# Patient Record
Sex: FEMALE | Race: WHITE | NOT HISPANIC OR LATINO | Employment: OTHER | URBAN - METROPOLITAN AREA
[De-identification: names, ages, dates, MRNs, and addresses within clinical notes are randomized per-mention and may not be internally consistent; named-entity substitution may affect disease eponyms.]

---

## 2023-06-15 ENCOUNTER — HOSPITAL ENCOUNTER (OUTPATIENT)
Dept: RADIOLOGY | Facility: HOSPITAL | Age: 68
Discharge: HOME | End: 2023-06-15
Attending: FAMILY MEDICINE
Payer: MEDICARE

## 2023-06-15 DIAGNOSIS — R10.9 ABDOMINAL PAIN: ICD-10-CM

## 2023-06-15 DIAGNOSIS — K80.20 GALLSTONES: ICD-10-CM

## 2023-06-15 PROCEDURE — 78227 NM HEPATOBILIARY(HIDA) WITH PHARM AND EF WHEN PERFORMED: ICD-10-PCS | Mod: 26,,, | Performed by: RADIOLOGY

## 2023-06-15 PROCEDURE — 78227 HEPATOBIL SYST IMAGE W/DRUG: CPT | Mod: 26,,, | Performed by: RADIOLOGY

## 2023-06-15 RX ORDER — SINCALIDE 5 UG/5ML
0.02 INJECTION, POWDER, LYOPHILIZED, FOR SOLUTION INTRAVENOUS ONCE
Status: DISCONTINUED | OUTPATIENT
Start: 2023-06-15 | End: 2023-06-16 | Stop reason: HOSPADM

## 2023-06-16 ENCOUNTER — TELEPHONE (OUTPATIENT)
Dept: SURGERY | Facility: CLINIC | Age: 68
End: 2023-06-16
Payer: MEDICARE

## 2023-06-16 NOTE — TELEPHONE ENCOUNTER
----- Message from Marcie Morrow sent at 6/16/2023 10:22 AM CDT -----  Regarding: Gen Surgery Referral  .Good morning,     Current patient is being referred to Dr Brittany Cruz from Dr Navid Willingham for gallstones and upper abdominal pain. Left voice mail for patient to call back to schedule. The next available appointment is 07-10-23. Message is being sent because I wasn't sure if you all need to see her sooner. I have scanned the referral and records in to media mgr. Please contact pt to schedule and let me know if I can help any further.     Thank you,    [facility]   Fax: [phone]

## 2023-06-16 NOTE — TELEPHONE ENCOUNTER
Writer spoke to patient and patient stated that she is already scheduled with Dr Gomez on 07/06/23. Writer expressed verbal understanding.

## 2023-07-06 ENCOUNTER — OFFICE VISIT (OUTPATIENT)
Dept: SURGERY | Facility: CLINIC | Age: 68
End: 2023-07-06
Payer: MEDICARE

## 2023-07-06 VITALS
TEMPERATURE: 98 F | HEIGHT: 61 IN | SYSTOLIC BLOOD PRESSURE: 172 MMHG | BODY MASS INDEX: 25.54 KG/M2 | DIASTOLIC BLOOD PRESSURE: 76 MMHG | WEIGHT: 135.25 LBS | HEART RATE: 72 BPM | RESPIRATION RATE: 16 BRPM

## 2023-07-06 DIAGNOSIS — K80.00 CALCULUS OF GALLBLADDER WITH ACUTE CHOLECYSTITIS WITHOUT OBSTRUCTION: Primary | ICD-10-CM

## 2023-07-06 DIAGNOSIS — K81.9 CHOLECYSTITIS: ICD-10-CM

## 2023-07-06 PROCEDURE — 99999 PR PBB SHADOW E&M-EST. PATIENT-LVL IV: ICD-10-PCS | Mod: PBBFAC,,, | Performed by: SURGERY

## 2023-07-06 PROCEDURE — 3077F PR MOST RECENT SYSTOLIC BLOOD PRESSURE >= 140 MM HG: ICD-10-PCS | Mod: CPTII,S$GLB,, | Performed by: SURGERY

## 2023-07-06 PROCEDURE — 1160F RVW MEDS BY RX/DR IN RCRD: CPT | Mod: CPTII,S$GLB,, | Performed by: SURGERY

## 2023-07-06 PROCEDURE — 99999 PR PBB SHADOW E&M-EST. PATIENT-LVL IV: CPT | Mod: PBBFAC,,, | Performed by: SURGERY

## 2023-07-06 PROCEDURE — 1159F MED LIST DOCD IN RCRD: CPT | Mod: CPTII,S$GLB,, | Performed by: SURGERY

## 2023-07-06 PROCEDURE — 99204 PR OFFICE/OUTPT VISIT, NEW, LEVL IV, 45-59 MIN: ICD-10-PCS | Mod: S$GLB,,, | Performed by: SURGERY

## 2023-07-06 PROCEDURE — 1125F AMNT PAIN NOTED PAIN PRSNT: CPT | Mod: CPTII,S$GLB,, | Performed by: SURGERY

## 2023-07-06 PROCEDURE — 1101F PR PT FALLS ASSESS DOC 0-1 FALLS W/OUT INJ PAST YR: ICD-10-PCS | Mod: CPTII,S$GLB,, | Performed by: SURGERY

## 2023-07-06 PROCEDURE — 3078F DIAST BP <80 MM HG: CPT | Mod: CPTII,S$GLB,, | Performed by: SURGERY

## 2023-07-06 PROCEDURE — 1125F PR PAIN SEVERITY QUANTIFIED, PAIN PRESENT: ICD-10-PCS | Mod: CPTII,S$GLB,, | Performed by: SURGERY

## 2023-07-06 PROCEDURE — 99204 OFFICE O/P NEW MOD 45 MIN: CPT | Mod: S$GLB,,, | Performed by: SURGERY

## 2023-07-06 PROCEDURE — 1160F PR REVIEW ALL MEDS BY PRESCRIBER/CLIN PHARMACIST DOCUMENTED: ICD-10-PCS | Mod: CPTII,S$GLB,, | Performed by: SURGERY

## 2023-07-06 PROCEDURE — 3288F PR FALLS RISK ASSESSMENT DOCUMENTED: ICD-10-PCS | Mod: CPTII,S$GLB,, | Performed by: SURGERY

## 2023-07-06 PROCEDURE — 1159F PR MEDICATION LIST DOCUMENTED IN MEDICAL RECORD: ICD-10-PCS | Mod: CPTII,S$GLB,, | Performed by: SURGERY

## 2023-07-06 PROCEDURE — 3288F FALL RISK ASSESSMENT DOCD: CPT | Mod: CPTII,S$GLB,, | Performed by: SURGERY

## 2023-07-06 PROCEDURE — 3008F BODY MASS INDEX DOCD: CPT | Mod: CPTII,S$GLB,, | Performed by: SURGERY

## 2023-07-06 PROCEDURE — 3008F PR BODY MASS INDEX (BMI) DOCUMENTED: ICD-10-PCS | Mod: CPTII,S$GLB,, | Performed by: SURGERY

## 2023-07-06 PROCEDURE — 1101F PT FALLS ASSESS-DOCD LE1/YR: CPT | Mod: CPTII,S$GLB,, | Performed by: SURGERY

## 2023-07-06 PROCEDURE — 3078F PR MOST RECENT DIASTOLIC BLOOD PRESSURE < 80 MM HG: ICD-10-PCS | Mod: CPTII,S$GLB,, | Performed by: SURGERY

## 2023-07-06 PROCEDURE — 3077F SYST BP >= 140 MM HG: CPT | Mod: CPTII,S$GLB,, | Performed by: SURGERY

## 2023-07-06 RX ORDER — SODIUM CHLORIDE 9 MG/ML
INJECTION, SOLUTION INTRAVENOUS CONTINUOUS
Status: CANCELLED | OUTPATIENT
Start: 2023-07-06

## 2023-07-06 RX ORDER — OMEPRAZOLE 40 MG/1
40 CAPSULE, DELAYED RELEASE ORAL EVERY MORNING
COMMUNITY
Start: 2023-04-26 | End: 2024-02-27 | Stop reason: SDUPTHER

## 2023-07-06 NOTE — PROGRESS NOTES
"Initial Consult    Cc: abdominal pain    History of Present Illness:  Patient is a 67 y.o. female who is referred for cholecystitis.  Up to 8-9/10 epigastric pain radiating to right under shoulder.  Associated nausea.  Sometimes worse with foods.  No stool abnormalities.     Review of patient's allergies indicates:  No Known Allergies    Current Outpatient Medications   Medication Sig Dispense Refill    collagen, bovine, 100 % Powd Take 1 tablet by mouth once daily.      omeprazole (PRILOSEC) 40 MG capsule Take 40 mg by mouth every morning.       No current facility-administered medications for this visit.       Past Medical History:   Diagnosis Date    Hiatal hernia with GERD      Past Surgical History:   Procedure Laterality Date    BLADDER SURGERY      HYSTERECTOMY      neck and back surgery       Family History   Problem Relation Age of Onset    Cholelithiasis Mother        Social History     Tobacco Use    Smoking status: Never     Passive exposure: Never    Smokeless tobacco: Never   Substance Use Topics    Alcohol use: Never    Drug use: Never        Review of Systems   Gastrointestinal:  Positive for abdominal pain, constipation and nausea.   All other systems reviewed and are negative.    Physical:     Vital Signs (Most Recent)  Temp: 98 °F (36.7 °C) (07/06/23 1311)  Pulse: 72 (07/06/23 1311)  Resp: 16 (07/06/23 1311)  BP: (!) 172/76 (07/06/23 1311)  5' 1" (1.549 m)  61.4 kg (135 lb 4 oz)     Physical Exam:  Physical Exam  Vitals and nursing note reviewed.   Constitutional:       General: She is not in acute distress.     Appearance: She is well-developed. She is not diaphoretic.   HENT:      Head: Normocephalic and atraumatic.      Mouth/Throat:      Pharynx: No oropharyngeal exudate.   Eyes:      General: No scleral icterus.     Conjunctiva/sclera: Conjunctivae normal.      Pupils: Pupils are equal, round, and reactive to light.   Neck:      Thyroid: No thyromegaly.      Vascular: No JVD.      Trachea: No " tracheal deviation.   Cardiovascular:      Rate and Rhythm: Normal rate and regular rhythm.      Heart sounds: Normal heart sounds. No murmur heard.    No friction rub. No gallop.   Pulmonary:      Effort: Pulmonary effort is normal. No respiratory distress.      Breath sounds: Normal breath sounds. No stridor. No wheezing or rales.   Chest:      Chest wall: No tenderness.   Abdominal:      General: Bowel sounds are normal. There is no distension.      Palpations: Abdomen is soft. There is no mass.      Tenderness: There is no abdominal tenderness. There is no guarding or rebound.   Musculoskeletal:         General: No tenderness. Normal range of motion.      Cervical back: Normal range of motion and neck supple.   Lymphadenopathy:      Cervical: No cervical adenopathy.   Skin:     General: Skin is warm and dry.      Findings: No erythema or rash.   Neurological:      Mental Status: She is alert and oriented to person, place, and time.      Cranial Nerves: No cranial nerve deficit.   Psychiatric:         Behavior: Behavior normal.     ASSESSMENT/PLAN:        1. Calculus of gallbladder with acute cholecystitis without obstruction  Case Request Operating Room: CHOLECYSTECTOMY, LAPAROSCOPIC      2. Cholecystitis            Lap karly monday

## 2023-07-07 ENCOUNTER — HOSPITAL ENCOUNTER (OUTPATIENT)
Dept: RADIOLOGY | Facility: HOSPITAL | Age: 68
Discharge: HOME | End: 2023-07-07
Attending: SURGERY
Payer: MEDICARE

## 2023-07-07 ENCOUNTER — HOSPITAL ENCOUNTER (OUTPATIENT)
Dept: PREADMISSION TESTING | Facility: HOSPITAL | Age: 68
Discharge: HOME | End: 2023-07-07
Attending: SURGERY
Payer: MEDICARE

## 2023-07-07 VITALS
HEIGHT: 61 IN | HEART RATE: 72 BPM | OXYGEN SATURATION: 94 % | RESPIRATION RATE: 18 BRPM | SYSTOLIC BLOOD PRESSURE: 152 MMHG | DIASTOLIC BLOOD PRESSURE: 80 MMHG | TEMPERATURE: 98 F | BODY MASS INDEX: 25.51 KG/M2 | WEIGHT: 135.13 LBS

## 2023-07-07 DIAGNOSIS — Z01.818 PRE-OP TESTING: Primary | ICD-10-CM

## 2023-07-07 DIAGNOSIS — Z01.818 PRE-OP TESTING: ICD-10-CM

## 2023-07-07 PROCEDURE — 93005 ELECTROCARDIOGRAM TRACING: CPT | Performed by: INTERNAL MEDICINE

## 2023-07-07 PROCEDURE — 93010 ELECTROCARDIOGRAM REPORT: CPT | Mod: ,,, | Performed by: INTERNAL MEDICINE

## 2023-07-07 PROCEDURE — 71046 X-RAY EXAM CHEST 2 VIEWS: CPT | Mod: TC

## 2023-07-07 PROCEDURE — 93010 EKG 12-LEAD: ICD-10-PCS | Mod: ,,, | Performed by: INTERNAL MEDICINE

## 2023-07-07 NOTE — PRE-PROCEDURE INSTRUCTIONS
Preadmit assessment complete. Review of pt health history and home medications.  Preop education per AVS. Pt voiced understanding       Advised to bring cpap DOS

## 2023-07-07 NOTE — DISCHARGE INSTRUCTIONS
To confirm, Your doctor has instructed you that surgery is scheduled for: July 10     Pre-Op will call Friday  between 4:00 and 6:00 PM with the final arrival time.       1 Person can come with you the day of surgery.    Please park in the Garage Parking and come through front entrance.      GO TO REGISTRATION     After registration, proceed past gift shop and through glass door ( [facility]) Check in at the nurses station to the left.   Do not eat or drink anything after midnight the night before your surgery - THIS INCLUDES  WATER, GUM, MINTS AND CANDY.  YOU MAY BRUSH YOUR TEETH BUT DO NOT SWALLOW     TAKE ONLY THESE MEDICATIONS WITH A SMALL SIP OF WATER THE MORNING OF YOUR PROCEDURE: NONE    Bring your C-PAP machine.      PLEASE NOTE:  The surgery schedule has many variables which may affect the time of your surgery case.  Family members should be available if your surgery time changes.  Plan to be here the day of your procedure between 4-6 hours.      DO NOT TAKE THESE MEDICATIONS 5-7 DAYS PRIOR to your procedure or per your surgeon's request: ASPIRIN, ALEVE, ADVIL, IBUPROFEN,  PARAS SELTZER, BC , FISH OIL , VITAMIN E, HERBALS  (May take Tylenol)                                                     IMPORTANT INSTRUCTIONS      Do not smoke, vape or drink alcoholic beverages 24 hours prior to your procedure.  Shower the night before AND the morning of your procedure with a Chlorhexidine wash such as Hibiclens or Dial antibacterial soap from the neck down.   No lotions, powder or oils on your skin after you shower. DO NOT WEAR DEODORANT   Do not get it on your face or in your eyes.  You may use your own shampoo and face wash. This helps your skin to be as bacteria free as possible.    DO NOT remove hair from the surgery site.  Do not shave the incision site unless you are given specific instructions to do so.    Sleep in a bed with clean sheets.    Do not sleep with a pet in the bed.   If you wear contact  lenses, dentures, hearing aids or glasses, bring a container to put them in during surgery and give to a family member for safe keeping.    Please leave all jewelry, piercing's and valuables at home.   Wear rubber soled shoes (no flip flops).  If your doctor has scheduled you for an overnight stay, bring a small overnight bag with any personal items you need.    Make arrangements in advance for transportation home by a responsible adult.      You must make arrangements for transportation, TAXI'S, UBER'S OR LYFTS ARE NOT ALLOWED.        If you have any questions about these instructions, call (Monday - Friday) Pre-Op Admit  Nursing  at [phone] or the Pre-Op Day Surgery Unit at [phone].

## 2023-07-10 ENCOUNTER — ANESTHESIA (OUTPATIENT)
Dept: SURGERY | Facility: HOSPITAL | Age: 68
End: 2023-07-10
Payer: MEDICARE

## 2023-07-10 ENCOUNTER — HOSPITAL ENCOUNTER (OUTPATIENT)
Facility: HOSPITAL | Age: 68
Discharge: HOME | End: 2023-07-10
Attending: SURGERY | Admitting: SURGERY
Payer: MEDICARE

## 2023-07-10 ENCOUNTER — ANESTHESIA EVENT (OUTPATIENT)
Dept: SURGERY | Facility: HOSPITAL | Age: 68
End: 2023-07-10
Payer: MEDICARE

## 2023-07-10 VITALS
TEMPERATURE: 98 F | BODY MASS INDEX: 25.49 KG/M2 | HEART RATE: 71 BPM | HEIGHT: 61 IN | RESPIRATION RATE: 16 BRPM | DIASTOLIC BLOOD PRESSURE: 72 MMHG | WEIGHT: 135 LBS | SYSTOLIC BLOOD PRESSURE: 131 MMHG | OXYGEN SATURATION: 99 %

## 2023-07-10 DIAGNOSIS — K81.9 CHOLECYSTITIS: Primary | ICD-10-CM

## 2023-07-10 PROCEDURE — 27000080 OPTIME MED/SURG SUP & DEVICES GENERAL CLASSIFICATION: Performed by: SURGERY

## 2023-07-10 PROCEDURE — 88304 TISSUE EXAM BY PATHOLOGIST: CPT | Mod: TC | Performed by: PATHOLOGY

## 2023-07-10 PROCEDURE — 63600175 PHARM REV CODE 636 W HCPCS: Performed by: STUDENT IN AN ORGANIZED HEALTH CARE EDUCATION/TRAINING PROGRAM

## 2023-07-10 PROCEDURE — 37000009 HC ANESTHESIA EA ADD 15 MINS: Performed by: SURGERY

## 2023-07-10 PROCEDURE — 36000708 HC OR TIME LEV III 1ST 15 MIN: Performed by: SURGERY

## 2023-07-10 PROCEDURE — D9220A PRA ANESTHESIA: ICD-10-PCS | Mod: ANES,,, | Performed by: STUDENT IN AN ORGANIZED HEALTH CARE EDUCATION/TRAINING PROGRAM

## 2023-07-10 PROCEDURE — 25000003 PHARM REV CODE 250: Performed by: NURSE ANESTHETIST, CERTIFIED REGISTERED

## 2023-07-10 PROCEDURE — 25000003 PHARM REV CODE 250: Performed by: SURGERY

## 2023-07-10 PROCEDURE — 36000709 HC OR TIME LEV III EA ADD 15 MIN: Performed by: SURGERY

## 2023-07-10 PROCEDURE — D9220A PRA ANESTHESIA: Mod: CRNA,,, | Performed by: NURSE ANESTHETIST, CERTIFIED REGISTERED

## 2023-07-10 PROCEDURE — 63600175 PHARM REV CODE 636 W HCPCS: Performed by: NURSE ANESTHETIST, CERTIFIED REGISTERED

## 2023-07-10 PROCEDURE — D9220A PRA ANESTHESIA: Mod: ANES,,, | Performed by: STUDENT IN AN ORGANIZED HEALTH CARE EDUCATION/TRAINING PROGRAM

## 2023-07-10 PROCEDURE — 71000033 HC RECOVERY, INTIAL HOUR: Performed by: SURGERY

## 2023-07-10 PROCEDURE — 25000003 PHARM REV CODE 250: Performed by: STUDENT IN AN ORGANIZED HEALTH CARE EDUCATION/TRAINING PROGRAM

## 2023-07-10 PROCEDURE — 71000015 HC POSTOP RECOV 1ST HR: Performed by: SURGERY

## 2023-07-10 PROCEDURE — D9220A PRA ANESTHESIA: ICD-10-PCS | Mod: CRNA,,, | Performed by: NURSE ANESTHETIST, CERTIFIED REGISTERED

## 2023-07-10 PROCEDURE — 37000008 HC ANESTHESIA 1ST 15 MINUTES: Performed by: SURGERY

## 2023-07-10 PROCEDURE — 27201423 OPTIME MED/SURG SUP & DEVICES STERILE SUPPLY: Performed by: SURGERY

## 2023-07-10 PROCEDURE — 47562 LAPAROSCOPIC CHOLECYSTECTOMY: CPT | Mod: ,,, | Performed by: SURGERY

## 2023-07-10 PROCEDURE — 47562 PR LAP,CHOLECYSTECTOMY: ICD-10-PCS | Mod: ,,, | Performed by: SURGERY

## 2023-07-10 RX ORDER — ONDANSETRON 2 MG/ML
4 INJECTION INTRAMUSCULAR; INTRAVENOUS DAILY PRN
Status: DISCONTINUED | OUTPATIENT
Start: 2023-07-10 | End: 2023-07-10 | Stop reason: HOSPADM

## 2023-07-10 RX ORDER — ROCURONIUM BROMIDE 10 MG/ML
INJECTION, SOLUTION INTRAVENOUS
Status: DISCONTINUED | OUTPATIENT
Start: 2023-07-10 | End: 2023-07-10

## 2023-07-10 RX ORDER — HYDROCODONE BITARTRATE AND ACETAMINOPHEN 7.5; 325 MG/1; MG/1
1 TABLET ORAL EVERY 4 HOURS PRN
Qty: 20 TABLET | Refills: 0 | Status: SHIPPED | OUTPATIENT
Start: 2023-07-10 | End: 2024-03-06

## 2023-07-10 RX ORDER — ONDANSETRON 4 MG/1
4 TABLET, ORALLY DISINTEGRATING ORAL EVERY 6 HOURS PRN
Qty: 30 TABLET | Refills: 0 | Status: SHIPPED | OUTPATIENT
Start: 2023-07-10

## 2023-07-10 RX ORDER — SODIUM CHLORIDE 9 MG/ML
INJECTION, SOLUTION INTRAVENOUS CONTINUOUS
Status: DISCONTINUED | OUTPATIENT
Start: 2023-07-10 | End: 2023-07-10 | Stop reason: HOSPADM

## 2023-07-10 RX ORDER — MIDAZOLAM HYDROCHLORIDE 1 MG/ML
INJECTION INTRAMUSCULAR; INTRAVENOUS
Status: DISCONTINUED | OUTPATIENT
Start: 2023-07-10 | End: 2023-07-10

## 2023-07-10 RX ORDER — LIDOCAINE HYDROCHLORIDE 10 MG/ML
INJECTION, SOLUTION INTRAVENOUS
Status: DISCONTINUED | OUTPATIENT
Start: 2023-07-10 | End: 2023-07-10

## 2023-07-10 RX ORDER — FAMOTIDINE 10 MG/ML
INJECTION INTRAVENOUS
Status: DISCONTINUED | OUTPATIENT
Start: 2023-07-10 | End: 2023-07-10

## 2023-07-10 RX ORDER — DIPHENHYDRAMINE HYDROCHLORIDE 50 MG/ML
12.5 INJECTION INTRAMUSCULAR; INTRAVENOUS
Status: DISCONTINUED | OUTPATIENT
Start: 2023-07-10 | End: 2023-07-10 | Stop reason: HOSPADM

## 2023-07-10 RX ORDER — ONDANSETRON 2 MG/ML
INJECTION INTRAMUSCULAR; INTRAVENOUS
Status: DISCONTINUED | OUTPATIENT
Start: 2023-07-10 | End: 2023-07-10

## 2023-07-10 RX ORDER — DEXMEDETOMIDINE HYDROCHLORIDE 100 UG/ML
INJECTION, SOLUTION INTRAVENOUS
Status: DISCONTINUED | OUTPATIENT
Start: 2023-07-10 | End: 2023-07-10

## 2023-07-10 RX ORDER — BUPIVACAINE HCL/EPINEPHRINE 0.25-.0005
VIAL (ML) INJECTION
Status: DISCONTINUED | OUTPATIENT
Start: 2023-07-10 | End: 2023-07-10 | Stop reason: HOSPADM

## 2023-07-10 RX ORDER — OXYCODONE HYDROCHLORIDE 5 MG/1
5 TABLET ORAL
Status: DISCONTINUED | OUTPATIENT
Start: 2023-07-10 | End: 2023-07-10 | Stop reason: HOSPADM

## 2023-07-10 RX ORDER — ACETAMINOPHEN 10 MG/ML
INJECTION, SOLUTION INTRAVENOUS
Status: DISCONTINUED | OUTPATIENT
Start: 2023-07-10 | End: 2023-07-10

## 2023-07-10 RX ORDER — FENTANYL CITRATE 50 UG/ML
INJECTION, SOLUTION INTRAMUSCULAR; INTRAVENOUS
Status: DISCONTINUED | OUTPATIENT
Start: 2023-07-10 | End: 2023-07-10

## 2023-07-10 RX ORDER — PROPOFOL 10 MG/ML
VIAL (ML) INTRAVENOUS
Status: DISCONTINUED | OUTPATIENT
Start: 2023-07-10 | End: 2023-07-10

## 2023-07-10 RX ORDER — HYDROMORPHONE HYDROCHLORIDE 1 MG/ML
0.2 INJECTION, SOLUTION INTRAMUSCULAR; INTRAVENOUS; SUBCUTANEOUS EVERY 5 MIN PRN
Status: DISCONTINUED | OUTPATIENT
Start: 2023-07-10 | End: 2023-07-10 | Stop reason: HOSPADM

## 2023-07-10 RX ORDER — DEXAMETHASONE SODIUM PHOSPHATE 4 MG/ML
INJECTION, SOLUTION INTRA-ARTICULAR; INTRALESIONAL; INTRAMUSCULAR; INTRAVENOUS; SOFT TISSUE
Status: DISCONTINUED | OUTPATIENT
Start: 2023-07-10 | End: 2023-07-10

## 2023-07-10 RX ADMIN — ONDANSETRON 4 MG: 2 INJECTION INTRAMUSCULAR; INTRAVENOUS at 11:07

## 2023-07-10 RX ADMIN — DEXAMETHASONE SODIUM PHOSPHATE 8 MG: 4 INJECTION, SOLUTION INTRAMUSCULAR; INTRAVENOUS at 11:07

## 2023-07-10 RX ADMIN — ACETAMINOPHEN 1000 MG: 10 INJECTION, SOLUTION INTRAVENOUS at 11:07

## 2023-07-10 RX ADMIN — DEXTROSE 2 G: 50 INJECTION, SOLUTION INTRAVENOUS at 11:07

## 2023-07-10 RX ADMIN — DEXMEDETOMIDINE HYDROCHLORIDE 12 MCG: 100 INJECTION, SOLUTION INTRAVENOUS at 11:07

## 2023-07-10 RX ADMIN — LIDOCAINE HYDROCHLORIDE 100 MG: 10 INJECTION, SOLUTION INTRAVENOUS at 11:07

## 2023-07-10 RX ADMIN — FENTANYL CITRATE 100 MCG: 50 INJECTION, SOLUTION INTRAMUSCULAR; INTRAVENOUS at 11:07

## 2023-07-10 RX ADMIN — PROPOFOL 100 MG: 10 INJECTION, EMULSION INTRAVENOUS at 11:07

## 2023-07-10 RX ADMIN — SODIUM CHLORIDE: 0.9 INJECTION, SOLUTION INTRAVENOUS at 11:07

## 2023-07-10 RX ADMIN — HYDROMORPHONE HYDROCHLORIDE 0.2 MG: 1 INJECTION, SOLUTION INTRAMUSCULAR; INTRAVENOUS; SUBCUTANEOUS at 01:07

## 2023-07-10 RX ADMIN — OXYCODONE HYDROCHLORIDE 5 MG: 5 TABLET ORAL at 01:07

## 2023-07-10 RX ADMIN — MIDAZOLAM HYDROCHLORIDE 2 MG: 1 INJECTION, SOLUTION INTRAMUSCULAR; INTRAVENOUS at 11:07

## 2023-07-10 RX ADMIN — ROCURONIUM BROMIDE 40 MG: 10 INJECTION, SOLUTION INTRAVENOUS at 11:07

## 2023-07-10 RX ADMIN — SUGAMMADEX 200 MG: 100 INJECTION, SOLUTION INTRAVENOUS at 12:07

## 2023-07-10 RX ADMIN — FAMOTIDINE 20 MG: 10 INJECTION, SOLUTION INTRAVENOUS at 11:07

## 2023-07-10 NOTE — DISCHARGE SUMMARY
[facility]  Discharge Note  Short Stay    Procedure(s) (LRB):  CHOLECYSTECTOMY, LAPAROSCOPIC (N/A)      OUTCOME: Patient tolerated treatment/procedure well without complication and is now ready for discharge.    DISPOSITION: Home or Self Care    FINAL DIAGNOSIS:  cholecystitis    FOLLOWUP: In clinic    DISCHARGE INSTRUCTIONS:    Discharge Procedure Orders   Diet Adult Regular     Lifting restrictions     Remove dressing in 24 hours         Clinical Reference Documents Added to Patient Instructions         Document    CHOLECYSTECTOMY, LAPAROSCOPIC SURGERY (ENGLISH)    GENERAL ANESTHESIA DISCHARGE INSTRUCTIONS (ENGLISH)            TIME SPENT ON DISCHARGE: 5 minutes

## 2023-07-10 NOTE — ANESTHESIA POSTPROCEDURE EVALUATION
Anesthesia Post Evaluation    Patient: Cornelia Mcdonough    Procedure(s) Performed: Procedure(s) (LRB):  CHOLECYSTECTOMY, LAPAROSCOPIC (N/A)    Final Anesthesia Type: general      Patient location during evaluation: PACU  Patient participation: Yes- Able to Participate  Level of consciousness: awake and alert  Post-procedure vital signs: reviewed and stable  Pain management: adequate  Airway patency: patent    PONV status at discharge: No PONV  Anesthetic complications: no      Cardiovascular status: hemodynamically stable  Respiratory status: unassisted, spontaneous ventilation and room air  Hydration status: euvolemic  Follow-up not needed.          Vitals Value Taken Time   /74 07/10/23 1300   Temp 36.1 °C (97 °F) 07/10/23 1245   Pulse 72 07/10/23 1300   Resp 19 07/10/23 1312   SpO2 95 % 07/10/23 1300         Event Time   Out of Recovery 13:18:00         Pain/Garrett Score: Pain Rating Prior to Med Admin: 7 (7/10/2023  1:12 PM)  Garrett Score: 10 (7/10/2023  1:12 PM)

## 2023-07-10 NOTE — ANESTHESIA PREPROCEDURE EVALUATION
07/10/2023  Cornelia Mcdonough is a 67 y.o., female.      There is no problem list on file for this patient.      Past Surgical History:   Procedure Laterality Date    BILATERAL TUBAL LIGATION      BLADDER SURGERY      lift, rectocele anterocele, , vaginal vault    HYSTERECTOMY      neck and back surgery      C 5-6 fusion; lumbar disc L 4-5        Tobacco Use:  The patient  reports that she has never smoked. She has never been exposed to tobacco smoke. She has never used smokeless tobacco.     Results for orders placed or performed during the hospital encounter of 07/07/23   EKG 12-lead    Collection Time: 07/07/23  7:01 AM    Narrative    Test Reason : Z01.818,    Vent. Rate : 070 BPM     Atrial Rate : 070 BPM     P-R Int : 136 ms          QRS Dur : 080 ms      QT Int : 422 ms       P-R-T Axes : 012 054 049 degrees     QTc Int : 455 ms    Normal sinus rhythm  Normal ECG  No previous ECGs available    Referred By:             Confirmed By:              Lab Results   Component Value Date    WBC 5.75 07/07/2023    HGB 13.0 07/07/2023    HCT 40.6 07/07/2023    MCV 97 07/07/2023     07/07/2023     BMP  Lab Results   Component Value Date     07/07/2023    K 3.8 07/07/2023     07/07/2023    CO2 28 07/07/2023    BUN 18 07/07/2023    CREATININE 0.7 07/07/2023    CALCIUM 9.6 07/07/2023    ANIONGAP 6 (L) 07/07/2023     (H) 07/07/2023       No results found for this or any previous visit.            Pre-op Assessment    I have reviewed the Patient Summary Reports.     I have reviewed the Nursing Notes. I have reviewed the NPO Status.   I have reviewed the Medications.     Review of Systems  Anesthesia Hx:  No problems with previous Anesthesia  Denies Family Hx of Anesthesia complications.   Denies Personal Hx of Anesthesia complications.   Social:  Non-Smoker     Hematology/Oncology:  Hematology Normal   Oncology Normal     EENT/Dental:EENT/Dental Normal   Cardiovascular:  Cardiovascular Normal     Pulmonary:  Pulmonary Normal    Renal/:  Renal/ Normal     Hepatic/GI:   Hiatal Hernia, GERD    Musculoskeletal:  Musculoskeletal Normal    Neurological:  Neurology Normal    Endocrine:  Endocrine Normal    Psych:  Psychiatric Normal           Physical Exam  General: Well nourished, Cooperative, Alert and Oriented    Airway:  Mallampati: II   Mouth Opening: Normal  TM Distance: Normal  Tongue: Normal  Neck ROM: Normal ROM    Dental:  Intact    Chest/Lungs:  Clear to auscultation    Heart:  Rate: Normal  Rhythm: Regular Rhythm  Sounds: Normal        Anesthesia Plan  Type of Anesthesia, risks & benefits discussed:    Anesthesia Type: Gen ETT  Intra-op Monitoring Plan: Standard ASA Monitors  Post Op Pain Control Plan: multimodal analgesia  Induction:  IV  Airway Plan: Video and Direct  Informed Consent: Informed consent signed with the Patient and all parties understand the risks and agree with anesthesia plan.  All questions answered.   ASA Score: 2    Ready For Surgery From Anesthesia Perspective.     .

## 2023-07-10 NOTE — PLAN OF CARE
Pt AAOx3, vital signs stable, pt tolerating oral liquids and voiding without difficulty, abdominal lap sites x4 clean, dry and intact.  IV removed without difficulty. catheter tip intact. no bleeding noted. Pt ready for discharge.

## 2023-07-10 NOTE — TRANSFER OF CARE
"Anesthesia Transfer of Care Note    Patient: Cornelia Mcdonough    Procedure(s) Performed: Procedure(s) (LRB):  CHOLECYSTECTOMY, LAPAROSCOPIC (N/A)    Patient location: PACU    Anesthesia Type: general    Transport from OR: Transported from OR on room air with adequate spontaneous ventilation    Post pain: adequate analgesia    Post assessment: no apparent anesthetic complications    Post vital signs: stable    Level of consciousness: awake    Nausea/Vomiting: no nausea/vomiting    Complications: none    Transfer of care protocol was followed      Last vitals:   Visit Vitals  BP (!) 158/75   Temp 36.8 °C (98.3 °F) (Oral)   Resp 17   Ht 5' 1" (1.549 m)   Wt 61.2 kg (135 lb)   SpO2 95%   Breastfeeding No   BMI 25.51 kg/m²     "

## 2023-07-10 NOTE — PATIENT INSTRUCTIONS
Remove band aids tonight  Keep white strips until they fall off  Shower over incisions daily with soap and water  Do not bath or get into a pool x 2 weeks  Kinney diet to avoid diarrhea

## 2023-07-10 NOTE — ANESTHESIA PROCEDURE NOTES
Intubation    Date/Time: 7/10/2023 11:21 AM  Performed by: Claudine Jackman CRNA  Authorized by: Edis Tolentino MD     Intubation:     Induction:  Intravenous    Intubated:  Postinduction    Mask Ventilation:  Easy mask    Attempts:  1    Attempted By:  CRNA    Method of Intubation:  Direct    Blade:  Khanna 3    Laryngeal View Grade: Grade I - full view of cords      Difficult Airway Encountered?: No      Complications:  None    Airway Device:  Oral endotracheal tube    Airway Device Size:  7.5    Style/Cuff Inflation:  Cuffed (inflated to minimal occlusive pressure)    Tube secured:  21    Secured at:  The lips    Placement Verified By:  Capnometry    Complicating Factors:  None    Findings Post-Intubation:  BS equal bilateral and atraumatic/condition of teeth unchanged

## 2023-07-26 ENCOUNTER — TELEPHONE (OUTPATIENT)
Dept: BARIATRICS | Facility: CLINIC | Age: 68
End: 2023-07-26

## 2023-07-26 ENCOUNTER — OFFICE VISIT (OUTPATIENT)
Dept: BARIATRICS | Facility: CLINIC | Age: 68
End: 2023-07-26
Payer: MEDICARE

## 2023-07-26 VITALS
HEIGHT: 61 IN | WEIGHT: 133.63 LBS | HEART RATE: 75 BPM | TEMPERATURE: 98 F | SYSTOLIC BLOOD PRESSURE: 126 MMHG | BODY MASS INDEX: 25.23 KG/M2 | DIASTOLIC BLOOD PRESSURE: 61 MMHG | RESPIRATION RATE: 16 BRPM

## 2023-07-26 DIAGNOSIS — K80.10 CALCULUS OF GALLBLADDER WITH CHRONIC CHOLECYSTITIS WITHOUT OBSTRUCTION: Primary | ICD-10-CM

## 2023-07-26 PROCEDURE — 1101F PR PT FALLS ASSESS DOC 0-1 FALLS W/OUT INJ PAST YR: ICD-10-PCS | Mod: CPTII,S$GLB,, | Performed by: SURGERY

## 2023-07-26 PROCEDURE — 3074F SYST BP LT 130 MM HG: CPT | Mod: CPTII,S$GLB,, | Performed by: SURGERY

## 2023-07-26 PROCEDURE — 3288F PR FALLS RISK ASSESSMENT DOCUMENTED: ICD-10-PCS | Mod: CPTII,S$GLB,, | Performed by: SURGERY

## 2023-07-26 PROCEDURE — 3078F PR MOST RECENT DIASTOLIC BLOOD PRESSURE < 80 MM HG: ICD-10-PCS | Mod: CPTII,S$GLB,, | Performed by: SURGERY

## 2023-07-26 PROCEDURE — 3008F BODY MASS INDEX DOCD: CPT | Mod: CPTII,S$GLB,, | Performed by: SURGERY

## 2023-07-26 PROCEDURE — 99024 PR POST-OP FOLLOW-UP VISIT: ICD-10-PCS | Mod: S$GLB,,, | Performed by: SURGERY

## 2023-07-26 PROCEDURE — 1101F PT FALLS ASSESS-DOCD LE1/YR: CPT | Mod: CPTII,S$GLB,, | Performed by: SURGERY

## 2023-07-26 PROCEDURE — 1126F PR PAIN SEVERITY QUANTIFIED, NO PAIN PRESENT: ICD-10-PCS | Mod: CPTII,S$GLB,, | Performed by: SURGERY

## 2023-07-26 PROCEDURE — 1126F AMNT PAIN NOTED NONE PRSNT: CPT | Mod: CPTII,S$GLB,, | Performed by: SURGERY

## 2023-07-26 PROCEDURE — 99999 PR PBB SHADOW E&M-EST. PATIENT-LVL III: ICD-10-PCS | Mod: PBBFAC,,, | Performed by: SURGERY

## 2023-07-26 PROCEDURE — 3074F PR MOST RECENT SYSTOLIC BLOOD PRESSURE < 130 MM HG: ICD-10-PCS | Mod: CPTII,S$GLB,, | Performed by: SURGERY

## 2023-07-26 PROCEDURE — 3078F DIAST BP <80 MM HG: CPT | Mod: CPTII,S$GLB,, | Performed by: SURGERY

## 2023-07-26 PROCEDURE — 99999 PR PBB SHADOW E&M-EST. PATIENT-LVL III: CPT | Mod: PBBFAC,,, | Performed by: SURGERY

## 2023-07-26 PROCEDURE — 3288F FALL RISK ASSESSMENT DOCD: CPT | Mod: CPTII,S$GLB,, | Performed by: SURGERY

## 2023-07-26 PROCEDURE — 99024 POSTOP FOLLOW-UP VISIT: CPT | Mod: S$GLB,,, | Performed by: SURGERY

## 2023-07-26 PROCEDURE — 3008F PR BODY MASS INDEX (BMI) DOCUMENTED: ICD-10-PCS | Mod: CPTII,S$GLB,, | Performed by: SURGERY

## 2023-07-26 NOTE — PROGRESS NOTES
Some nausea when wakes in morning  No pain  Tolerating diet without     Vitals:    07/26/23 0948   BP: 126/61   Pulse: 75   Resp: 16   Temp: 98.3 °F (36.8 °C)     Abdomen benign  Well healed     Path reviewed     Status post lap karly doing very well.    Continue diet as tolerated, activity as tolerated  Try reflux medicine at night as I suspect her nausea in the morning may be related to reflux.  She will return to see me in a couple of months to see how she is doing.  Continue washing of her incisions every day soap and water

## 2023-09-13 ENCOUNTER — PATIENT MESSAGE (OUTPATIENT)
Dept: BARIATRICS | Facility: CLINIC | Age: 68
End: 2023-09-13
Payer: MEDICARE

## 2023-09-21 ENCOUNTER — LAB VISIT (OUTPATIENT)
Dept: LAB | Facility: HOSPITAL | Age: 68
End: 2023-09-21
Attending: FAMILY MEDICINE
Payer: MEDICARE

## 2023-09-21 DIAGNOSIS — R50.9 FEVER: Primary | ICD-10-CM

## 2023-09-21 DIAGNOSIS — R68.83 CHILLS: ICD-10-CM

## 2023-09-21 LAB
BASOPHILS # BLD AUTO: 0.03 K/UL (ref 0–0.2)
BASOPHILS NFR BLD: 0.6 % (ref 0–1.9)
C DIFF GDH STL QL: NEGATIVE
C DIFF TOX A+B STL QL IA: NEGATIVE
CRP SERPL-MCNC: 40 MG/L (ref 0–8.2)
DIFFERENTIAL METHOD: ABNORMAL
EOSINOPHIL # BLD AUTO: 0.2 K/UL (ref 0–0.5)
EOSINOPHIL NFR BLD: 4 % (ref 0–8)
ERYTHROCYTE [DISTWIDTH] IN BLOOD BY AUTOMATED COUNT: 12.7 % (ref 11.5–14.5)
HCT VFR BLD AUTO: 36.7 % (ref 37–48.5)
HGB BLD-MCNC: 11.9 G/DL (ref 12–16)
IMM GRANULOCYTES # BLD AUTO: 0.01 K/UL (ref 0–0.04)
IMM GRANULOCYTES NFR BLD AUTO: 0.2 % (ref 0–0.5)
LYMPHOCYTES # BLD AUTO: 1.8 K/UL (ref 1–4.8)
LYMPHOCYTES NFR BLD: 38 % (ref 18–48)
MCH RBC QN AUTO: 31.3 PG (ref 27–31)
MCHC RBC AUTO-ENTMCNC: 32.4 G/DL (ref 32–36)
MCV RBC AUTO: 97 FL (ref 82–98)
MONOCYTES # BLD AUTO: 0.4 K/UL (ref 0.3–1)
MONOCYTES NFR BLD: 7.6 % (ref 4–15)
NEUTROPHILS # BLD AUTO: 2.4 K/UL (ref 1.8–7.7)
NEUTROPHILS NFR BLD: 49.6 % (ref 38–73)
NRBC BLD-RTO: 0 /100 WBC
PLATELET # BLD AUTO: 291 K/UL (ref 150–450)
PMV BLD AUTO: 8.9 FL (ref 9.2–12.9)
RBC # BLD AUTO: 3.8 M/UL (ref 4–5.4)
WBC # BLD AUTO: 4.74 K/UL (ref 3.9–12.7)

## 2023-09-21 PROCEDURE — 87209 SMEAR COMPLEX STAIN: CPT | Performed by: FAMILY MEDICINE

## 2023-09-21 PROCEDURE — 87449 NOS EACH ORGANISM AG IA: CPT | Performed by: FAMILY MEDICINE

## 2023-09-21 PROCEDURE — 85025 COMPLETE CBC W/AUTO DIFF WBC: CPT | Performed by: FAMILY MEDICINE

## 2023-09-21 PROCEDURE — 87045 FECES CULTURE AEROBIC BACT: CPT | Performed by: FAMILY MEDICINE

## 2023-09-21 PROCEDURE — 87427 SHIGA-LIKE TOXIN AG IA: CPT | Mod: 59 | Performed by: FAMILY MEDICINE

## 2023-09-21 PROCEDURE — 86140 C-REACTIVE PROTEIN: CPT | Performed by: FAMILY MEDICINE

## 2023-09-21 PROCEDURE — 87449 NOS EACH ORGANISM AG IA: CPT | Mod: 91 | Performed by: FAMILY MEDICINE

## 2023-09-21 PROCEDURE — 87046 STOOL CULTR AEROBIC BACT EA: CPT | Performed by: FAMILY MEDICINE

## 2023-09-21 PROCEDURE — 36415 COLL VENOUS BLD VENIPUNCTURE: CPT | Performed by: FAMILY MEDICINE

## 2023-09-22 ENCOUNTER — HOSPITAL ENCOUNTER (OUTPATIENT)
Dept: RADIOLOGY | Facility: HOSPITAL | Age: 68
Discharge: HOME | End: 2023-09-22
Attending: FAMILY MEDICINE
Payer: MEDICARE

## 2023-09-22 DIAGNOSIS — R68.83 CHILLS: ICD-10-CM

## 2023-09-22 DIAGNOSIS — R10.9 PAIN IN THE ABDOMEN: ICD-10-CM

## 2023-09-22 DIAGNOSIS — R50.9 FEVER: ICD-10-CM

## 2023-09-22 DIAGNOSIS — R19.7 DIARRHEA: ICD-10-CM

## 2023-09-22 LAB
E COLI SXT1 STL QL IA: NEGATIVE
E COLI SXT2 STL QL IA: NEGATIVE

## 2023-09-22 PROCEDURE — 25500020 PHARM REV CODE 255: Performed by: FAMILY MEDICINE

## 2023-09-22 PROCEDURE — A9698 NON-RAD CONTRAST MATERIALNOC: HCPCS | Performed by: FAMILY MEDICINE

## 2023-09-22 RX ADMIN — IOHEXOL 1000 ML: 9 SOLUTION ORAL at 03:09

## 2023-09-23 ENCOUNTER — LAB VISIT (OUTPATIENT)
Dept: LAB | Facility: HOSPITAL | Age: 68
End: 2023-09-23
Attending: FAMILY MEDICINE
Payer: MEDICARE

## 2023-09-23 DIAGNOSIS — R68.83 CHILLS: ICD-10-CM

## 2023-09-23 DIAGNOSIS — R50.9 FEVER: ICD-10-CM

## 2023-09-23 PROCEDURE — 87209 SMEAR COMPLEX STAIN: CPT | Performed by: FAMILY MEDICINE

## 2023-09-25 LAB — BACTERIA STL CULT: NORMAL

## 2023-09-27 LAB — O+P STL MICRO: NORMAL

## 2023-09-28 LAB — O+P STL MICRO: NORMAL

## 2023-09-29 LAB
O+P STL MICRO: NORMAL
O+P STL MICRO: NORMAL

## 2023-10-11 ENCOUNTER — OFFICE VISIT (OUTPATIENT)
Dept: BARIATRICS | Facility: CLINIC | Age: 68
End: 2023-10-11
Payer: MEDICARE

## 2023-10-11 VITALS
DIASTOLIC BLOOD PRESSURE: 68 MMHG | SYSTOLIC BLOOD PRESSURE: 142 MMHG | HEIGHT: 61 IN | RESPIRATION RATE: 16 BRPM | WEIGHT: 140.63 LBS | HEART RATE: 77 BPM | TEMPERATURE: 99 F | BODY MASS INDEX: 26.55 KG/M2

## 2023-10-11 DIAGNOSIS — K43.2 INCISIONAL HERNIA WITHOUT OBSTRUCTION OR GANGRENE: ICD-10-CM

## 2023-10-11 DIAGNOSIS — K44.9 HIATAL HERNIA WITH GERD: ICD-10-CM

## 2023-10-11 DIAGNOSIS — K21.9 HIATAL HERNIA WITH GERD: ICD-10-CM

## 2023-10-11 DIAGNOSIS — K80.10 CALCULUS OF GALLBLADDER WITH CHRONIC CHOLECYSTITIS WITHOUT OBSTRUCTION: Primary | ICD-10-CM

## 2023-10-11 PROCEDURE — 3078F DIAST BP <80 MM HG: CPT | Mod: CPTII,S$GLB,, | Performed by: SURGERY

## 2023-10-11 PROCEDURE — 3288F FALL RISK ASSESSMENT DOCD: CPT | Mod: CPTII,S$GLB,, | Performed by: SURGERY

## 2023-10-11 PROCEDURE — 99213 OFFICE O/P EST LOW 20 MIN: CPT | Mod: S$GLB,,, | Performed by: SURGERY

## 2023-10-11 PROCEDURE — 1159F MED LIST DOCD IN RCRD: CPT | Mod: CPTII,S$GLB,, | Performed by: SURGERY

## 2023-10-11 PROCEDURE — 3008F BODY MASS INDEX DOCD: CPT | Mod: CPTII,S$GLB,, | Performed by: SURGERY

## 2023-10-11 PROCEDURE — 3008F PR BODY MASS INDEX (BMI) DOCUMENTED: ICD-10-PCS | Mod: CPTII,S$GLB,, | Performed by: SURGERY

## 2023-10-11 PROCEDURE — 99213 PR OFFICE/OUTPT VISIT, EST, LEVL III, 20-29 MIN: ICD-10-PCS | Mod: S$GLB,,, | Performed by: SURGERY

## 2023-10-11 PROCEDURE — 1126F AMNT PAIN NOTED NONE PRSNT: CPT | Mod: CPTII,S$GLB,, | Performed by: SURGERY

## 2023-10-11 PROCEDURE — 99999 PR PBB SHADOW E&M-EST. PATIENT-LVL III: CPT | Mod: PBBFAC,,, | Performed by: SURGERY

## 2023-10-11 PROCEDURE — 3077F PR MOST RECENT SYSTOLIC BLOOD PRESSURE >= 140 MM HG: ICD-10-PCS | Mod: CPTII,S$GLB,, | Performed by: SURGERY

## 2023-10-11 PROCEDURE — 1159F PR MEDICATION LIST DOCUMENTED IN MEDICAL RECORD: ICD-10-PCS | Mod: CPTII,S$GLB,, | Performed by: SURGERY

## 2023-10-11 PROCEDURE — 3078F PR MOST RECENT DIASTOLIC BLOOD PRESSURE < 80 MM HG: ICD-10-PCS | Mod: CPTII,S$GLB,, | Performed by: SURGERY

## 2023-10-11 PROCEDURE — 1126F PR PAIN SEVERITY QUANTIFIED, NO PAIN PRESENT: ICD-10-PCS | Mod: CPTII,S$GLB,, | Performed by: SURGERY

## 2023-10-11 PROCEDURE — 1101F PT FALLS ASSESS-DOCD LE1/YR: CPT | Mod: CPTII,S$GLB,, | Performed by: SURGERY

## 2023-10-11 PROCEDURE — 3077F SYST BP >= 140 MM HG: CPT | Mod: CPTII,S$GLB,, | Performed by: SURGERY

## 2023-10-11 PROCEDURE — 99999 PR PBB SHADOW E&M-EST. PATIENT-LVL III: ICD-10-PCS | Mod: PBBFAC,,, | Performed by: SURGERY

## 2023-10-11 PROCEDURE — 1101F PR PT FALLS ASSESS DOC 0-1 FALLS W/OUT INJ PAST YR: ICD-10-PCS | Mod: CPTII,S$GLB,, | Performed by: SURGERY

## 2023-10-11 PROCEDURE — 3288F PR FALLS RISK ASSESSMENT DOCUMENTED: ICD-10-PCS | Mod: CPTII,S$GLB,, | Performed by: SURGERY

## 2023-10-11 NOTE — PROGRESS NOTES
Nausea resolved  Some reflux still     Vitals:    10/11/23 0936   BP: (!) 142/68   Pulse: 77   Resp: 16   Temp: 98.7 °F (37.1 °C)     Abdomen is benign with well-healed incisions  I reviewed the CT scan with her and we did see ventral hernia.  This is easily palpable on her exam.  It does feel incarcerated but not strangulated.  It is nontender and soft.    Assessment plan  CT scan is reviewed and there are some pelvic findings that she is following up with her uro gynecologist, hernia findings that we discussed, and hiatal hernia findings.  I do suspect that her hiatal hernia albeit small is likely resulting in some of her reflux complaints.  Seems that her pain has resolved.  We did spend some time talking about her ventral hernias.  I worry that these will become strangulated at some point as they do involve the bowel.  I did recommend that we should likely proceed with an intervention at some point to fix them.  She is planning to see her uro gynecologist and if he is planning and intervention for her CT findings I would ask that they use a general surgeon that may repair this at the same time as that surgery to avoid having 2 different surgeries.  If not we will talk about repairing this at our next visit.  I think her reflux is likely related to her hiatal hernia and seems to be controlled with her antacids at this time.  She is otherwise doing well from a lap karly and should follow up to see me in a few months.

## 2023-12-13 ENCOUNTER — TELEPHONE (OUTPATIENT)
Dept: BARIATRICS | Facility: CLINIC | Age: 68
End: 2023-12-13
Payer: MEDICARE

## 2023-12-13 ENCOUNTER — OFFICE VISIT (OUTPATIENT)
Dept: SURGERY | Facility: CLINIC | Age: 68
End: 2023-12-13
Payer: MEDICARE

## 2023-12-13 VITALS
RESPIRATION RATE: 16 BRPM | HEIGHT: 61 IN | BODY MASS INDEX: 26.71 KG/M2 | TEMPERATURE: 98 F | DIASTOLIC BLOOD PRESSURE: 76 MMHG | HEART RATE: 71 BPM | SYSTOLIC BLOOD PRESSURE: 175 MMHG | WEIGHT: 141.44 LBS

## 2023-12-13 DIAGNOSIS — K43.2 INCISIONAL HERNIA WITHOUT OBSTRUCTION OR GANGRENE: ICD-10-CM

## 2023-12-13 DIAGNOSIS — K21.9 GASTROESOPHAGEAL REFLUX DISEASE, UNSPECIFIED WHETHER ESOPHAGITIS PRESENT: Primary | ICD-10-CM

## 2023-12-13 PROCEDURE — 1125F AMNT PAIN NOTED PAIN PRSNT: CPT | Mod: CPTII,S$GLB,, | Performed by: SURGERY

## 2023-12-13 PROCEDURE — 3077F PR MOST RECENT SYSTOLIC BLOOD PRESSURE >= 140 MM HG: ICD-10-PCS | Mod: CPTII,S$GLB,, | Performed by: SURGERY

## 2023-12-13 PROCEDURE — 99213 PR OFFICE/OUTPT VISIT, EST, LEVL III, 20-29 MIN: ICD-10-PCS | Mod: S$GLB,,, | Performed by: SURGERY

## 2023-12-13 PROCEDURE — 3008F PR BODY MASS INDEX (BMI) DOCUMENTED: ICD-10-PCS | Mod: CPTII,S$GLB,, | Performed by: SURGERY

## 2023-12-13 PROCEDURE — 3078F PR MOST RECENT DIASTOLIC BLOOD PRESSURE < 80 MM HG: ICD-10-PCS | Mod: CPTII,S$GLB,, | Performed by: SURGERY

## 2023-12-13 PROCEDURE — 3008F BODY MASS INDEX DOCD: CPT | Mod: CPTII,S$GLB,, | Performed by: SURGERY

## 2023-12-13 PROCEDURE — 1159F PR MEDICATION LIST DOCUMENTED IN MEDICAL RECORD: ICD-10-PCS | Mod: CPTII,S$GLB,, | Performed by: SURGERY

## 2023-12-13 PROCEDURE — 99999 PR PBB SHADOW E&M-EST. PATIENT-LVL IV: CPT | Mod: PBBFAC,,, | Performed by: SURGERY

## 2023-12-13 PROCEDURE — 1125F PR PAIN SEVERITY QUANTIFIED, PAIN PRESENT: ICD-10-PCS | Mod: CPTII,S$GLB,, | Performed by: SURGERY

## 2023-12-13 PROCEDURE — 1159F MED LIST DOCD IN RCRD: CPT | Mod: CPTII,S$GLB,, | Performed by: SURGERY

## 2023-12-13 PROCEDURE — 3078F DIAST BP <80 MM HG: CPT | Mod: CPTII,S$GLB,, | Performed by: SURGERY

## 2023-12-13 PROCEDURE — 99213 OFFICE O/P EST LOW 20 MIN: CPT | Mod: S$GLB,,, | Performed by: SURGERY

## 2023-12-13 PROCEDURE — 3077F SYST BP >= 140 MM HG: CPT | Mod: CPTII,S$GLB,, | Performed by: SURGERY

## 2023-12-13 PROCEDURE — 99999 PR PBB SHADOW E&M-EST. PATIENT-LVL IV: ICD-10-PCS | Mod: PBBFAC,,, | Performed by: SURGERY

## 2023-12-13 NOTE — PROGRESS NOTES
Nausea and reflux persist  No obstructive symptoms  Pain mainly in epigastrium    Vitals:    12/13/23 0908   BP: (!) 175/76   Pulse: 71   Resp: 16   Temp: 98.4 °F (36.9 °C)     Abdomen soft benign non tender, lower hernia is soft but difficult to reduce- on pain    A/P    Incisional hernia- planning to See rubyo for robotic mesh repair- about 4 cm (might have another one at belly button)  Saw her uro gyn who was not planning any gyn procedure and ok'd hernia repair as per her report    Nausea/reflux/ abdominal pain- sp cholecystectomy but persistent symptoms-Mod Hiatal hernia with reflux symptoms-[id number]-[id number]- look for objective signs of reflux.  Plan repair depending on results.

## 2023-12-13 NOTE — TELEPHONE ENCOUNTER
Returned call to pt. She just wanted to let us know that she was able to schedule an appt with GI next week.     ----- Message from Sudhir White sent at 12/13/2023 12:14 PM CST -----  Type: Needs Medical Advice    Who Called:  Pt      Best Call Back Number: [phone]      Additional Information: Pt is calling to speak to nurse in regards to mauricio with [facility] in gastro. Please call back to advise, Thanks!

## 2023-12-18 ENCOUNTER — TELEPHONE (OUTPATIENT)
Dept: BARIATRICS | Facility: CLINIC | Age: 68
End: 2023-12-18
Payer: MEDICARE

## 2023-12-18 NOTE — TELEPHONE ENCOUNTER
Returned call to pt. She asked for an explanation of what the UGI that she is scheduled for on 12/20/23 entails. Explained to procedure to pt. Reminded her NPO 4 hrs prior, but will not receive sedation so no  needed. Pt VU    ----- Message from Jerrica Pierson sent at 12/18/2023 10:49 AM CST -----  Contact: Patient  Type:  Needs Medical Advice    Who Called:   Patient    Would the patient rather a call back or a response via [facility]?   Call back  Best Call Back Number:   [phone]    Additional Information:   States she would like to speak with someone about her test on 12/20 - states she has questions - please call - thank you

## 2023-12-20 ENCOUNTER — HOSPITAL ENCOUNTER (OUTPATIENT)
Dept: RADIOLOGY | Facility: HOSPITAL | Age: 68
Discharge: HOME | End: 2023-12-20
Attending: SURGERY
Payer: MEDICARE

## 2023-12-20 ENCOUNTER — OFFICE VISIT (OUTPATIENT)
Dept: GASTROENTEROLOGY | Facility: CLINIC | Age: 68
End: 2023-12-20
Payer: MEDICARE

## 2023-12-20 ENCOUNTER — LAB VISIT (OUTPATIENT)
Dept: LAB | Facility: HOSPITAL | Age: 68
End: 2023-12-20
Payer: MEDICARE

## 2023-12-20 ENCOUNTER — TELEPHONE (OUTPATIENT)
Dept: GASTROENTEROLOGY | Facility: CLINIC | Age: 68
End: 2023-12-20
Payer: MEDICARE

## 2023-12-20 VITALS
SYSTOLIC BLOOD PRESSURE: 133 MMHG | WEIGHT: 138.44 LBS | BODY MASS INDEX: 26.14 KG/M2 | HEART RATE: 77 BPM | HEIGHT: 61 IN | DIASTOLIC BLOOD PRESSURE: 63 MMHG

## 2023-12-20 DIAGNOSIS — Z12.11 SCREENING FOR COLON CANCER: ICD-10-CM

## 2023-12-20 DIAGNOSIS — K21.9 GASTROESOPHAGEAL REFLUX DISEASE, UNSPECIFIED WHETHER ESOPHAGITIS PRESENT: ICD-10-CM

## 2023-12-20 DIAGNOSIS — K44.9 HIATAL HERNIA: ICD-10-CM

## 2023-12-20 DIAGNOSIS — R11.0 NAUSEA: ICD-10-CM

## 2023-12-20 DIAGNOSIS — R13.10 DYSPHAGIA, UNSPECIFIED TYPE: ICD-10-CM

## 2023-12-20 DIAGNOSIS — R10.13 EPIGASTRIC PAIN: ICD-10-CM

## 2023-12-20 DIAGNOSIS — K22.2 SCHATZKI'S RING: ICD-10-CM

## 2023-12-20 DIAGNOSIS — R12 WATERBRASH: ICD-10-CM

## 2023-12-20 DIAGNOSIS — R12 HEARTBURN: ICD-10-CM

## 2023-12-20 DIAGNOSIS — R10.13 EPIGASTRIC PAIN: Primary | ICD-10-CM

## 2023-12-20 DIAGNOSIS — R14.2 BELCHING: ICD-10-CM

## 2023-12-20 LAB — LIPASE SERPL-CCNC: 48 U/L (ref 4–60)

## 2023-12-20 PROCEDURE — 1101F PT FALLS ASSESS-DOCD LE1/YR: CPT | Mod: CPTII,S$GLB,,

## 2023-12-20 PROCEDURE — 1126F AMNT PAIN NOTED NONE PRSNT: CPT | Mod: CPTII,S$GLB,,

## 2023-12-20 PROCEDURE — 99999 PR PBB SHADOW E&M-EST. PATIENT-LVL IV: CPT | Mod: PBBFAC,,,

## 2023-12-20 PROCEDURE — 99203 OFFICE O/P NEW LOW 30 MIN: CPT | Mod: S$GLB,,,

## 2023-12-20 PROCEDURE — 3078F PR MOST RECENT DIASTOLIC BLOOD PRESSURE < 80 MM HG: ICD-10-PCS | Mod: CPTII,S$GLB,,

## 2023-12-20 PROCEDURE — 1126F PR PAIN SEVERITY QUANTIFIED, NO PAIN PRESENT: ICD-10-PCS | Mod: CPTII,S$GLB,,

## 2023-12-20 PROCEDURE — 83690 ASSAY OF LIPASE: CPT

## 2023-12-20 PROCEDURE — 3078F DIAST BP <80 MM HG: CPT | Mod: CPTII,S$GLB,,

## 2023-12-20 PROCEDURE — 1160F RVW MEDS BY RX/DR IN RCRD: CPT | Mod: CPTII,S$GLB,,

## 2023-12-20 PROCEDURE — 3075F SYST BP GE 130 - 139MM HG: CPT | Mod: CPTII,S$GLB,,

## 2023-12-20 PROCEDURE — 99999 PR PBB SHADOW E&M-EST. PATIENT-LVL IV: ICD-10-PCS | Mod: PBBFAC,,,

## 2023-12-20 PROCEDURE — 36415 COLL VENOUS BLD VENIPUNCTURE: CPT

## 2023-12-20 PROCEDURE — 1101F PR PT FALLS ASSESS DOC 0-1 FALLS W/OUT INJ PAST YR: ICD-10-PCS | Mod: CPTII,S$GLB,,

## 2023-12-20 PROCEDURE — 74240 FL UPPER GI TO INCLUDE ESOPHAGRAM: ICD-10-PCS | Mod: 26,,, | Performed by: RADIOLOGY

## 2023-12-20 PROCEDURE — 1159F MED LIST DOCD IN RCRD: CPT | Mod: CPTII,S$GLB,,

## 2023-12-20 PROCEDURE — A9698 NON-RAD CONTRAST MATERIALNOC: HCPCS | Performed by: SURGERY

## 2023-12-20 PROCEDURE — 74240 X-RAY XM UPR GI TRC 1CNTRST: CPT | Mod: 26,,, | Performed by: RADIOLOGY

## 2023-12-20 PROCEDURE — 1160F PR REVIEW ALL MEDS BY PRESCRIBER/CLIN PHARMACIST DOCUMENTED: ICD-10-PCS | Mod: CPTII,S$GLB,,

## 2023-12-20 PROCEDURE — 3075F PR MOST RECENT SYSTOLIC BLOOD PRESS GE 130-139MM HG: ICD-10-PCS | Mod: CPTII,S$GLB,,

## 2023-12-20 PROCEDURE — 3008F BODY MASS INDEX DOCD: CPT | Mod: CPTII,S$GLB,,

## 2023-12-20 PROCEDURE — 3008F PR BODY MASS INDEX (BMI) DOCUMENTED: ICD-10-PCS | Mod: CPTII,S$GLB,,

## 2023-12-20 PROCEDURE — 99203 PR OFFICE/OUTPT VISIT, NEW, LEVL III, 30-44 MIN: ICD-10-PCS | Mod: S$GLB,,,

## 2023-12-20 PROCEDURE — 74240 X-RAY XM UPR GI TRC 1CNTRST: CPT | Mod: TC

## 2023-12-20 PROCEDURE — 1159F PR MEDICATION LIST DOCUMENTED IN MEDICAL RECORD: ICD-10-PCS | Mod: CPTII,S$GLB,,

## 2023-12-20 PROCEDURE — 25500020 PHARM REV CODE 255: Performed by: SURGERY

## 2023-12-20 PROCEDURE — 3288F PR FALLS RISK ASSESSMENT DOCUMENTED: ICD-10-PCS | Mod: CPTII,S$GLB,,

## 2023-12-20 PROCEDURE — 3288F FALL RISK ASSESSMENT DOCD: CPT | Mod: CPTII,S$GLB,,

## 2023-12-20 RX ORDER — SUCRALFATE 1 G/1
1 TABLET ORAL 4 TIMES DAILY
Qty: 40 TABLET | Refills: 0 | Status: SHIPPED | OUTPATIENT
Start: 2023-12-20 | End: 2023-12-30

## 2023-12-20 RX ORDER — FLUCONAZOLE 200 MG/1
200 TABLET ORAL
COMMUNITY
Start: 2023-12-11 | End: 2024-03-06

## 2023-12-20 RX ADMIN — BARIUM SULFATE 140 ML: 980 POWDER, FOR SUSPENSION ORAL at 10:12

## 2023-12-20 RX ADMIN — BARIUM SULFATE 355 ML: 0.6 SUSPENSION ORAL at 10:12

## 2023-12-20 NOTE — TELEPHONE ENCOUNTER
Obtained signed, dated auth for release of confidential information via patient. Called [facility] to obtain Dr. Eriberto Irvin's fax number. Per staff, fax request to [phone] for medical records. Faxed request to fax number provided by [facility] staff.

## 2023-12-20 NOTE — PROGRESS NOTES
Subjective:       Patient ID: Cornelia Mcdonough is a 68 y.o. female Body mass index is 26.16 kg/m².    Chief Complaint: Abdominal Pain    This patient is new to me.  Referring Provider: Dr. Cherri Gomez for epigastric pain.  Established patient of Dr. Brandee DELANEY.     GI Problem  The primary symptoms include abdominal pain and nausea (mild nausea that occurs spontaneously subsides on own). Primary symptoms do not include fever, weight loss, fatigue, vomiting, diarrhea, melena, hematemesis, jaundice, hematochezia, dysuria, myalgias or arthralgias.   Onset: chronic pain, recently pain has worsened. Progression: pain waxes and wanes, occurs mostly in the morning. The abdominal pain is located in the epigastric region. The abdominal pain does not radiate. The severity of the abdominal pain is 2/10 (described pain as burning pain). Relieved by: eventually subsides, states PPI does not help for pain.   The illness is also significant for dysphagia (chronic dysphagia occurs with solid foods and pills feels like getting stuck in chest area hx of EGD w/ dilation, Schatzki ring, comes up or eventually goes down). The illness does not include odynophagia, bloating or constipation. Associated symptoms comments: Has a BM daily, rates stool type 4 on bristol stool scale, states last colonoscopy was in 2016 denies hx of colon polyps, denies any family history of colon cancer, was told to repeat colonoscopy in 10 years  S/p cholecystectomy in July/2023 with Dr. Gomez hx of Moderate size Hiatal hernia with reflux symptoms  12/20/2023  [place] UPPER GI TO INCLUDE ESOPHAGRAM  Impression:  Moderate sliding hiatal hernia with copious gastroesophageal reflux.  Schatzki ring at the GE junction, with delay in passage of a barium tablet into the stomach.    . Significant associated medical issues include GERD. Associated medical issues do not include inflammatory bowel disease, gallstones, liver disease, alcohol abuse, PUD, gastric bypass,  bowel resection, irritable bowel syndrome, hemorrhoids or diverticulitis.   Gastroesophageal Reflux  She complains of abdominal pain, belching, dysphagia (chronic dysphagia occurs with solid foods and pills feels like getting stuck in chest area hx of EGD w/ dilation, Schatzki ring, comes up or eventually goes down), heartburn, nausea (mild nausea that occurs spontaneously subsides on own) and water brash. She reports no chest pain, no choking, no coughing, no early satiety, no globus sensation or no hoarse voice. This is a chronic problem. The current episode started more than 1 year ago. The problem occurs occasionally. The problem has been waxing and waning. The heartburn is located in the substernum. The heartburn is of moderate intensity. The heartburn does not wake her from sleep. The heartburn does not limit her activity. The heartburn changes with position. The symptoms are aggravated by certain foods (spicy foods, chili, gumbo). Pertinent negatives include no anemia, fatigue, melena, muscle weakness, orthopnea or weight loss. Risk factors include hiatal hernia. She has tried a PPI (takes omeprazole 40mg orally prn patient does not want to take it daily as she is concerned with the side effects) for the symptoms. The treatment provided moderate relief. Past procedures include an EGD. Past procedures do not include an abdominal ultrasound, esophageal manometry, esophageal pH monitoring, H. pylori antibody titer or a UGI. Past invasive treatments do not include gastroplasty, gastroplication or reflux surgery.       Review of Systems   Constitutional:  Negative for fatigue, fever and weight loss.   HENT:  Negative for hoarse voice.    Respiratory:  Negative for cough and choking.    Cardiovascular:  Negative for chest pain.   Gastrointestinal:  Positive for abdominal pain, dysphagia (chronic dysphagia occurs with solid foods and pills feels like getting stuck in chest area hx of EGD w/ dilation, Schatzki ring,  comes up or eventually goes down), heartburn and nausea (mild nausea that occurs spontaneously subsides on own). Negative for bloating, constipation, diarrhea, hematemesis, hematochezia, jaundice, melena and vomiting.   Genitourinary:  Negative for dysuria.   Musculoskeletal:  Negative for arthralgias, myalgias and muscle weakness.         No LMP recorded. Patient is postmenopausal.  Past Medical History:   Diagnosis Date    Basal cell carcinoma     face and neck    Gallbladder attack     abdominal pain and nausea    Hiatal hernia with GERD      Past Surgical History:   Procedure Laterality Date    BILATERAL TUBAL LIGATION      BLADDER SURGERY      lift, rectocele anterocele, , vaginal vault    COLONOSCOPY      2016    HYSTERECTOMY      LAPAROSCOPIC CHOLECYSTECTOMY N/A 07/10/2023    Procedure: CHOLECYSTECTOMY, LAPAROSCOPIC;  Surgeon: Cherri Gomez MD;  Location: [facility];  Service: General;  Laterality: N/A;    neck and back surgery      C 5-6 fusion; lumbar disc L 4-5    UPPER GASTROINTESTINAL ENDOSCOPY      2019     Family History   Problem Relation Age of Onset    Cholelithiasis Mother     Colon cancer Neg Hx      Social History     Tobacco Use    Smoking status: Never     Passive exposure: Never    Smokeless tobacco: Never   Substance Use Topics    Alcohol use: Never    Drug use: Never     Wt Readings from Last 10 Encounters:   12/20/23 62.8 kg (138 lb 7.2 oz)   12/13/23 64.1 kg (141 lb 6.8 oz)   10/11/23 63.8 kg (140 lb 10.5 oz)   07/26/23 60.6 kg (133 lb 9.6 oz)   07/10/23 61.2 kg (135 lb)   07/07/23 61.3 kg (135 lb 2.3 oz)   07/06/23 61.4 kg (135 lb 4 oz)     Lab Results   Component Value Date    WBC 4.74 09/21/2023    HGB 11.9 (L) 09/21/2023    HCT 36.7 (L) 09/21/2023    MCV 97 09/21/2023     09/21/2023     CMP  Sodium   Date Value Ref Range Status   07/07/2023 139 136 - 145 mmol/L Final     Potassium   Date Value Ref Range Status   07/07/2023 3.8 3.5 - 5.1 mmol/L Final     Chloride   Date Value  "Ref Range Status   07/07/2023 105 95 - 110 mmol/L Final     CO2   Date Value Ref Range Status   07/07/2023 28 23 - 29 mmol/L Final     Glucose   Date Value Ref Range Status   07/07/2023 112 (H) 70 - 110 mg/dL Final     BUN   Date Value Ref Range Status   07/07/2023 18 8 - 23 mg/dL Final     Creatinine   Date Value Ref Range Status   07/07/2023 0.7 0.5 - 1.4 mg/dL Final     Calcium   Date Value Ref Range Status   07/07/2023 9.6 8.7 - 10.5 mg/dL Final     Total Protein   Date Value Ref Range Status   07/07/2023 8.2 6.0 - 8.4 g/dL Final     Albumin   Date Value Ref Range Status   07/07/2023 4.6 3.5 - 5.2 g/dL Final     Total Bilirubin   Date Value Ref Range Status   07/07/2023 0.8 0.1 - 1.0 mg/dL Final     Comment:     For infants and newborns, interpretation of results should be based  on gestational age, weight and in agreement with clinical  observations.    Premature Infant recommended reference ranges:  Up to 24 hours.............<8.0 mg/dL  Up to 48 hours............<12.0 mg/dL  3-5 days..................<15.0 mg/dL  6-29 days.................<15.0 mg/dL       Alkaline Phosphatase   Date Value Ref Range Status   07/07/2023 83 55 - 135 U/L Final     AST   Date Value Ref Range Status   07/07/2023 19 10 - 40 U/L Final     ALT   Date Value Ref Range Status   07/07/2023 32 10 - 44 U/L Final     Anion Gap   Date Value Ref Range Status   07/07/2023 6 (L) 8 - 16 mmol/L Final     Lab Results   Component Value Date    LIPASE 48 12/20/2023     No results found for: "LIPASERES"  No results found for: "TSH"    Reviewed prior medical records including radiology report of CT abdomen 9/22/23, [place] upper GI 12/13/23 & endoscopy history (see surgical history/procedures).    Objective:      Physical Exam  Vitals and nursing note reviewed.   Constitutional:       Appearance: Normal appearance. She is normal weight.   Cardiovascular:      Rate and Rhythm: Normal rate and regular rhythm.      Heart sounds: Normal heart sounds. "   Pulmonary:      Breath sounds: Normal breath sounds.   Abdominal:      General: Bowel sounds are normal.      Palpations: Abdomen is soft.      Tenderness: There is no abdominal tenderness.   Skin:     General: Skin is warm and dry.      Coloration: Skin is not jaundiced.   Neurological:      Mental Status: She is alert and oriented to person, place, and time.   Psychiatric:         Mood and Affect: Mood normal.         Behavior: Behavior normal.         Assessment:       1. Epigastric pain    2. Gastroesophageal reflux disease, unspecified whether esophagitis present    3. Belching    4. Heartburn    5. Waterbrash    6. Nausea    7. Dysphagia, unspecified type    8. Schatzki's ring    9. Hiatal hernia    10. Screening for colon cancer        Plan:       Epigastric pain  -    START sucralfate (CARAFATE) 1 gram tablet; Take 1 tablet (1 g total) by mouth 4 (four) times daily. for 10 days  Dispense: 40 tablet; Refill: 0  -     Lipase; Future; Expected date: 12/20/2023  -     Case Request Endoscopy: EGD (ESOPHAGOGASTRODUODENOSCOPY)  - schedule EGD, discussed procedure with patient, including risks and benefits, patient verbalized understanding  - START omeprazole 40 mg daily  - follow GERD lifestyle modifications    Gastroesophageal reflux disease, unspecified whether esophagitis present, belching, heartburn, water brash  -     Case Request Endoscopy: EGD (ESOPHAGOGASTRODUODENOSCOPY)  - START omeprazole 40 mg daily  -Take PPI 30min-1hr before eating breakfast  -Educated patient on lifestyle modifications to help control/reduce reflux/abdominal pain including: avoid large meals, avoid eating within 2-3 hours of bedtime (avoid late night eating & lying down soon after eating), elevate head of bed if nocturnal symptoms are present, smoking cessation (if current smoker), & weight loss (if overweight).   -Educated to avoid known foods which trigger reflux symptoms & to minimize/avoid high-fat foods, chocolate, caffeine,  citrus, alcohol, & tomato products.  -Advised to avoid/limit use of NSAID's, since they can cause GI upset, bleeding, and/or ulcers. If needed, take with food.   - Educated patient on Proven risks of long term PPI use, including pneumonia, c.diff infection, and bone loss, as well as theoretical risks including dementia and CKD, were discussed with the patient at length and the patient understands risks and benefits of therapy.  Option of tapering/weaning PPI away was also discussed, including the need for possible long term therapy to treat symptoms if they recur after cessation of medication, as well as to mitigate the risk of developing complications of reflux such as Arroyo's esophagus and/or esophageal cancer.  Patient understands the risks and benefits of treatment with drug versus cessation.  Daily supplementation with MVI with calcium and vitamin D were recommended as was follow up with PCP for bone scan when appropriate.  Labs including B12, folate, CMP, CBC, calcium, and magnesium should be checked at least annually    Nausea  -     Case Request Endoscopy: EGD (ESOPHAGOGASTRODUODENOSCOPY)  - - START omeprazole 40 mg daily  -consider zofran   -consider gastric emptying study    Dysphagia, unspecified type, Schatzki ring  -     Case Request Endoscopy: EGD (ESOPHAGOGASTRODUODENOSCOPY)  - schedule EGD, discussed procedure with patient and possible esophageal dilation may be performed during procedure if indicated, patient verbalized understanding  - educated patient to eat smaller more frequent meals and to eat slowly and advised to eat a soft diet.  - possible UGI/esophagram/esophageal manometry if symptoms persist    Hiatal hernia  -     Case Request Endoscopy: EGD (ESOPHAGOGASTRODUODENOSCOPY)  -discussed diagnosis with patient & that it is usually managed by controlling reflux symptoms, surgery is an option, but usually performed if reflux is uncontrolled by medication management and lifestyle/dietary  modifications; if symptoms persist despite medication management and lifestyle/dietary modifications, we can refer to general surgery to consult about surgical options, patient verbalized understanding    Screening for colon cancer   -last colonoscopy in 2016 form  of release signed to obtain a copy to schedule surveillance colonoscopy      Follow up in about 4 weeks (around 1/17/2024), or if symptoms worsen or fail to improve.      If no improvement in symptoms or symptoms worsen, call/follow-up at clinic or go to ER.       [facility] - GASTROENTEROLOGY  [facility], [place]     Dictation software program was used for this note. Please expect some simple typographical  errors in this note.    Encounter includes face to face time and non-face to face time preparing to see the patient (eg, review of tests), obtaining and/or reviewing separately obtained history, documenting clinical information in the electronic or other health record, independently interpreting results (not separately reported) and communicating results to the patient/family/caregiver, or care coordination (not separately reported).

## 2024-01-09 ENCOUNTER — OFFICE VISIT (OUTPATIENT)
Dept: SURGERY | Facility: CLINIC | Age: 69
End: 2024-01-09
Payer: MEDICARE

## 2024-01-09 VITALS — TEMPERATURE: 97 F | SYSTOLIC BLOOD PRESSURE: 170 MMHG | HEART RATE: 97 BPM | DIASTOLIC BLOOD PRESSURE: 79 MMHG

## 2024-01-09 DIAGNOSIS — K43.2 INCISIONAL HERNIA WITHOUT OBSTRUCTION OR GANGRENE: ICD-10-CM

## 2024-01-09 PROCEDURE — 99999 PR PBB SHADOW E&M-EST. PATIENT-LVL III: CPT | Mod: PBBFAC,,, | Performed by: SURGERY

## 2024-01-09 PROCEDURE — 99214 OFFICE O/P EST MOD 30 MIN: CPT | Mod: S$GLB,,, | Performed by: SURGERY

## 2024-01-09 PROCEDURE — 1160F RVW MEDS BY RX/DR IN RCRD: CPT | Mod: CPTII,S$GLB,, | Performed by: SURGERY

## 2024-01-09 PROCEDURE — 3078F DIAST BP <80 MM HG: CPT | Mod: CPTII,S$GLB,, | Performed by: SURGERY

## 2024-01-09 PROCEDURE — 3288F FALL RISK ASSESSMENT DOCD: CPT | Mod: CPTII,S$GLB,, | Performed by: SURGERY

## 2024-01-09 PROCEDURE — 1125F AMNT PAIN NOTED PAIN PRSNT: CPT | Mod: CPTII,S$GLB,, | Performed by: SURGERY

## 2024-01-09 PROCEDURE — 3077F SYST BP >= 140 MM HG: CPT | Mod: CPTII,S$GLB,, | Performed by: SURGERY

## 2024-01-09 PROCEDURE — 1101F PT FALLS ASSESS-DOCD LE1/YR: CPT | Mod: CPTII,S$GLB,, | Performed by: SURGERY

## 2024-01-09 PROCEDURE — 1159F MED LIST DOCD IN RCRD: CPT | Mod: CPTII,S$GLB,, | Performed by: SURGERY

## 2024-01-09 RX ORDER — LEVOCETIRIZINE DIHYDROCHLORIDE 5 MG/1
5 TABLET, FILM COATED ORAL
COMMUNITY
Start: 2023-12-29

## 2024-01-09 RX ORDER — FLUTICASONE PROPIONATE 50 MCG
2 SPRAY, SUSPENSION (ML) NASAL
COMMUNITY
Start: 2023-12-29

## 2024-01-09 NOTE — PROGRESS NOTES
Subjective:       Patient ID: Cornelia Mcdonough is a 68 y.o. female.    Chief Complaint: Other (Hiatal Hernia, & umbilical )      HPI:  68 year old female referred to the office with complicated ventral hernia. She has had multiple surgeries addressing rectocele and cystocele - at least five. Her most recent operation was in 2018. It included mesh removal and repair. She started to notice bulging at the umbilicus and in the lower abdomen. CT shows 3 cm periumbilical hernia and large lower abdominal wall midline incisional hernia from the previous pfannenstiel incisions. She has no obstructive symptoms. She has sensation of a heavy or hanging feeling in the lower abdomen with standing. CT also showed hiatal hernia and recurrent cystocele and rectocele. She has been in contact with her surgeon regarding the cystocele. She was told that repair could probably be done vaginally if or when it becomes indicated for repair. She has appointment with Dr. Gomez in two weeks for follow up and to discuss hiatal hernia. She has chronic epigastric abdominal pain and reflux symptoms. Symptoms associated with dysphagia as well. She had cholecystectomy July 2023. She has history of Schatzki ring dilatation.    Swallow study 12/20/23 Impression:  Moderate sliding hiatal hernia with copious gastroesophageal reflux.  Schatzki ring at the GE junction, with delay in passage of a barium tablet into the stomach.    Past Medical History:   Diagnosis Date    Basal cell carcinoma     face and neck    Gallbladder attack     abdominal pain and nausea    Hiatal hernia with GERD      Past Surgical History:   Procedure Laterality Date    BILATERAL TUBAL LIGATION      BLADDER SURGERY      lift, rectocele anterocele, , vaginal vault    COLONOSCOPY      2016    HYSTERECTOMY      LAPAROSCOPIC CHOLECYSTECTOMY N/A 07/10/2023    Procedure: CHOLECYSTECTOMY, LAPAROSCOPIC;  Surgeon: Cherri Gomez MD;  Location: [facility];  Service: General;   Laterality: N/A;    neck and back surgery      C 5-6 fusion; lumbar disc L 4-5    UPPER GASTROINTESTINAL ENDOSCOPY      2019     Review of patient's allergies indicates:   Allergen Reactions    Pyridium [phenazopyridine] Nausea And Vomiting     Medication List with Changes/Refills   Current Medications    COLLAGEN, BOVINE, 100 % POWD    Take 1 tablet by mouth once daily.    FLUCONAZOLE (DIFLUCAN) 200 MG TAB    Take 200 mg by mouth every 7 days.    FLUTICASONE PROPIONATE (FLONASE) 50 MCG/ACTUATION NASAL SPRAY    2 sprays by Each Nostril route.    HYDROCODONE-ACETAMINOPHEN (NORCO) 7.5-325 MG PER TABLET    Take 1 tablet by mouth every 4 (four) hours as needed for Pain.    LEVOCETIRIZINE (XYZAL) 5 MG TABLET    Take 5 mg by mouth.    OMEPRAZOLE (PRILOSEC) 40 MG CAPSULE    Take 40 mg by mouth every morning.    ONDANSETRON (ZOFRAN-ODT) 4 MG TBDL    Take 1 tablet (4 mg total) by mouth every 6 (six) hours as needed (nv).     Family History   Problem Relation Age of Onset    Cholelithiasis Mother     Colon cancer Neg Hx      Social History     Socioeconomic History    Marital status:    Tobacco Use    Smoking status: Never     Passive exposure: Never    Smokeless tobacco: Never   Substance and Sexual Activity    Alcohol use: Never    Drug use: Never         Review of Systems   Constitutional:  Negative for appetite change, chills, fever and unexpected weight change.   HENT:  Negative for hearing loss, rhinorrhea, sore throat and voice change.    Eyes:  Negative for photophobia and visual disturbance.   Respiratory:  Negative for cough, choking and shortness of breath.    Cardiovascular:  Negative for chest pain, palpitations and leg swelling.   Gastrointestinal:  Positive for abdominal pain. Negative for blood in stool, constipation, diarrhea, nausea and vomiting.        Dysphagia  Reflux.    Endocrine: Negative for cold intolerance, heat intolerance, polydipsia and polyuria.   Musculoskeletal:  Negative for  arthralgias, back pain, joint swelling and neck stiffness.   Skin:  Negative for color change, pallor and rash.   Neurological:  Negative for dizziness, seizures, syncope and headaches.   Hematological:  Negative for adenopathy. Does not bruise/bleed easily.   Psychiatric/Behavioral:  Negative for agitation, behavioral problems and confusion.        Objective:      Physical Exam  Constitutional:       General: She is awake. She is not in acute distress.     Appearance: Normal appearance. She is well-developed. She is not toxic-appearing.   HENT:      Head: Normocephalic and atraumatic.   Pulmonary:      Effort: No tachypnea, bradypnea or respiratory distress.   Abdominal:      General: There is no distension.      Palpations: Abdomen is soft.      Tenderness: There is abdominal tenderness. There is no guarding.      Hernia: A hernia is present. Hernia is present in the umbilical area and ventral area.          Comments: Two separate hernias. Mild tenderness with reduction of the lower incisional hernia.    Musculoskeletal:      Cervical back: Neck supple.   Neurological:      Mental Status: She is alert.   Psychiatric:         Behavior: Behavior is cooperative.         Assessment/Plan:   Incisional hernia without obstruction or gangrene  -     Ambulatory referral/consult to General Surgery    Patient is a complicated case. She has multiple issues that include complicated ventral hernia, hiatal hernia with reflux, esophageal narrowing with dysphagia, cystocele and rectocele.     The ventral hernia is mor complicated due to the location in the inferior abdomen and her multiple lower abdominal and pelvic operations. Would plan on a robotic approach to repair.     The hiatal hernia may also reach criteria for repair. EGD is still pending. She has appointment in two weeks to discuss with Dr. Gomez.    I will discuss case with Dr. Gomez after their appointment. We will come up with plan on how to approach multiple  procedures - staged or at same time?

## 2024-01-23 ENCOUNTER — OFFICE VISIT (OUTPATIENT)
Dept: SURGERY | Facility: CLINIC | Age: 69
End: 2024-01-23
Payer: MEDICARE

## 2024-01-23 VITALS
TEMPERATURE: 98 F | SYSTOLIC BLOOD PRESSURE: 132 MMHG | HEART RATE: 76 BPM | WEIGHT: 138.75 LBS | HEIGHT: 61 IN | DIASTOLIC BLOOD PRESSURE: 61 MMHG | RESPIRATION RATE: 16 BRPM | BODY MASS INDEX: 26.2 KG/M2

## 2024-01-23 DIAGNOSIS — K43.2 INCISIONAL HERNIA WITHOUT OBSTRUCTION OR GANGRENE: Primary | ICD-10-CM

## 2024-01-23 DIAGNOSIS — K21.9 GASTROESOPHAGEAL REFLUX DISEASE, UNSPECIFIED WHETHER ESOPHAGITIS PRESENT: ICD-10-CM

## 2024-01-23 PROCEDURE — 99999 PR PBB SHADOW E&M-EST. PATIENT-LVL IV: CPT | Mod: PBBFAC,,, | Performed by: SURGERY

## 2024-01-23 PROCEDURE — 3075F SYST BP GE 130 - 139MM HG: CPT | Mod: CPTII,S$GLB,, | Performed by: SURGERY

## 2024-01-23 PROCEDURE — 1159F MED LIST DOCD IN RCRD: CPT | Mod: CPTII,S$GLB,, | Performed by: SURGERY

## 2024-01-23 PROCEDURE — 3288F FALL RISK ASSESSMENT DOCD: CPT | Mod: CPTII,S$GLB,, | Performed by: SURGERY

## 2024-01-23 PROCEDURE — 1101F PT FALLS ASSESS-DOCD LE1/YR: CPT | Mod: CPTII,S$GLB,, | Performed by: SURGERY

## 2024-01-23 PROCEDURE — 99214 OFFICE O/P EST MOD 30 MIN: CPT | Mod: S$GLB,,, | Performed by: SURGERY

## 2024-01-23 PROCEDURE — 1126F AMNT PAIN NOTED NONE PRSNT: CPT | Mod: CPTII,S$GLB,, | Performed by: SURGERY

## 2024-01-23 PROCEDURE — 3078F DIAST BP <80 MM HG: CPT | Mod: CPTII,S$GLB,, | Performed by: SURGERY

## 2024-01-23 PROCEDURE — 3008F BODY MASS INDEX DOCD: CPT | Mod: CPTII,S$GLB,, | Performed by: SURGERY

## 2024-01-23 NOTE — PROGRESS NOTES
Symptoms persist.  She has seen Gastroenterology provider and has started her on sucralfate.  She is finish this medication without any improvement in symptoms.  They have scheduled her for endoscopy in March.  She has seen Dr. Zambrano and they have discussed hernia repair with mesh robotically.    Vitals:    01/23/24 0948   BP: 132/61   Pulse: 76   Resp: 16   Temp: 98.4 °F (36.9 °C)     Abdomen is benign with incisional hernia    Assessment plan  1.  Incisional hernia   2. Nausea reflux abdominal pain  3. Hiatal hernia moderate with reflux    I have discussed this case with Dr. Zambrano he is planning to repair her hernia with the robot.  After reviewing the x-ray I believe her hiatal hernia is causing her reflux nausea and dysphagia.  I would like to get an endoscopy to evaluate for stricture and reflux esophagitis.  After this I think hiatal hernia repair would be appropriate.  She will need to seek out cardiac clearance prior to surgery as these are considered elective and she is over the age of 65.  I will plan to see her after and consider a combined surgery with Dr. Zambrano.  We talked about how this may end up being a 4 hour surgery.       -cards clearance  -ugi reviewed- mod hiatal hernia with copious reflux  -Get egd-

## 2024-01-25 ENCOUNTER — TELEPHONE (OUTPATIENT)
Dept: GASTROENTEROLOGY | Facility: CLINIC | Age: 69
End: 2024-01-25
Payer: MEDICARE

## 2024-01-25 NOTE — TELEPHONE ENCOUNTER
Call placed to Ms. Mcdonough per Dr. Brown to move EGD up sooner. No answer, left message to return call.

## 2024-02-15 ENCOUNTER — ANESTHESIA EVENT (OUTPATIENT)
Dept: ENDOSCOPY | Facility: HOSPITAL | Age: 69
End: 2024-02-15
Payer: MEDICARE

## 2024-02-15 ENCOUNTER — HOSPITAL ENCOUNTER (OUTPATIENT)
Facility: HOSPITAL | Age: 69
Discharge: HOME | End: 2024-02-15
Attending: INTERNAL MEDICINE | Admitting: INTERNAL MEDICINE
Payer: MEDICARE

## 2024-02-15 ENCOUNTER — ANESTHESIA (OUTPATIENT)
Dept: ENDOSCOPY | Facility: HOSPITAL | Age: 69
End: 2024-02-15
Payer: MEDICARE

## 2024-02-15 DIAGNOSIS — K21.9 GERD (GASTROESOPHAGEAL REFLUX DISEASE): ICD-10-CM

## 2024-02-15 PROCEDURE — 43239 EGD BIOPSY SINGLE/MULTIPLE: CPT | Mod: 59,,, | Performed by: INTERNAL MEDICINE

## 2024-02-15 PROCEDURE — D9220A PRA ANESTHESIA: Mod: CRNA,,, | Performed by: NURSE ANESTHETIST, CERTIFIED REGISTERED

## 2024-02-15 PROCEDURE — 88305 TISSUE EXAM BY PATHOLOGIST: CPT | Mod: TC,59 | Performed by: PATHOLOGY

## 2024-02-15 PROCEDURE — D9220A PRA ANESTHESIA: Mod: ANES,,, | Performed by: ANESTHESIOLOGY

## 2024-02-15 PROCEDURE — 27201012 HC FORCEPS, HOT/COLD, DISP: Performed by: INTERNAL MEDICINE

## 2024-02-15 PROCEDURE — 43251 EGD REMOVE LESION SNARE: CPT | Performed by: INTERNAL MEDICINE

## 2024-02-15 PROCEDURE — 43239 EGD BIOPSY SINGLE/MULTIPLE: CPT | Mod: 59 | Performed by: INTERNAL MEDICINE

## 2024-02-15 PROCEDURE — 37000009 HC ANESTHESIA EA ADD 15 MINS: Performed by: INTERNAL MEDICINE

## 2024-02-15 PROCEDURE — 43251 EGD REMOVE LESION SNARE: CPT | Mod: ,,, | Performed by: INTERNAL MEDICINE

## 2024-02-15 PROCEDURE — 25000003 PHARM REV CODE 250: Performed by: INTERNAL MEDICINE

## 2024-02-15 PROCEDURE — 37000008 HC ANESTHESIA 1ST 15 MINUTES: Performed by: INTERNAL MEDICINE

## 2024-02-15 PROCEDURE — 25000003 PHARM REV CODE 250: Performed by: NURSE ANESTHETIST, CERTIFIED REGISTERED

## 2024-02-15 PROCEDURE — 43249 ESOPH EGD DILATION <30 MM: CPT | Mod: 59 | Performed by: INTERNAL MEDICINE

## 2024-02-15 PROCEDURE — 43249 ESOPH EGD DILATION <30 MM: CPT | Mod: 51,,, | Performed by: INTERNAL MEDICINE

## 2024-02-15 PROCEDURE — C1726 CATH, BAL DIL, NON-VASCULAR: HCPCS | Performed by: INTERNAL MEDICINE

## 2024-02-15 RX ORDER — SODIUM CHLORIDE 9 MG/ML
INJECTION, SOLUTION INTRAVENOUS CONTINUOUS
Status: DISCONTINUED | OUTPATIENT
Start: 2024-02-15 | End: 2024-02-15 | Stop reason: HOSPADM

## 2024-02-15 RX ORDER — PROPOFOL 10 MG/ML
VIAL (ML) INTRAVENOUS
Status: DISCONTINUED | OUTPATIENT
Start: 2024-02-15 | End: 2024-02-15

## 2024-02-15 RX ORDER — LIDOCAINE HYDROCHLORIDE 20 MG/ML
INJECTION INTRAVENOUS
Status: DISCONTINUED | OUTPATIENT
Start: 2024-02-15 | End: 2024-02-15

## 2024-02-15 RX ADMIN — Medication 50 MG: at 01:02

## 2024-02-15 RX ADMIN — LIDOCAINE HYDROCHLORIDE 50 MG: 20 INJECTION, SOLUTION INTRAVENOUS at 01:02

## 2024-02-15 RX ADMIN — Medication 100 MG: at 01:02

## 2024-02-15 RX ADMIN — SODIUM CHLORIDE: 9 INJECTION, SOLUTION INTRAVENOUS at 12:02

## 2024-02-15 NOTE — [employer] PATIENT INSTRUCTIONS
Discharge Summary/Instructions after an Endoscopic Procedure  Patient Name: Cornelia Mcdonough  Patient MRN: [id number]  Patient YOB: 1955  Thursday, February 15, 2024  Gaby Brown MD  Dear patient,  As a result of recent federal legislation (The Federal Cures Act), you may   receive lab or pathology results from your procedure in your [facility]   account before your physician is able to contact you. Your physician or   their representative will relay the results to you with their   recommendations at their soonest availability.  Thank you,  RESTRICTIONS:  During your procedure today, you received medications for sedation.  These   medications may affect your judgment, balance and coordination.  Therefore,   for 24 hours, you have the following restrictions:   - DO NOT drive a car, operate machinery, make legal/financial decisions,   sign important papers or drink alcohol.    ACTIVITY:  Today: no heavy lifting, straining or running due to procedural   sedation/anesthesia.  The following day: return to full activity including work.  DIET:  Eat and drink normally unless instructed otherwise.     TREATMENT FOR COMMON SIDE EFFECTS:  - Mild abdominal pain, nausea, belching, bloating or excessive gas:  rest,   eat lightly and use a heating pad.  - Sore Throat: treat with throat lozenges and/or gargle with warm salt   water.  - Because air was used during the procedure, expelling large amounts of air   from your rectum or belching is normal.  - If a bowel prep was taken, you may not have a bowel movement for 1-3 days.    This is normal.  SYMPTOMS TO WATCH FOR AND REPORT TO YOUR PHYSICIAN:  1. Abdominal pain or bloating, other than gas cramps.  2. Chest pain.  3. Back pain.  4. Signs of infection such as: chills or fever occurring within 24 hours   after the procedure.  5. Rectal bleeding, which would show as bright red, maroon, or black stools.   (A tablespoon of blood from the rectum is not  serious, especially if   hemorrhoids are present.)  6. Vomiting.  7. Weakness or dizziness.  GO DIRECTLY TO THE NEAREST EMERGENCY ROOM IF YOU HAVE ANY OF THE FOLLOWING:      Difficulty breathing              Chills and/or fever over 101 F   Persistent vomiting and/or vomiting blood   Severe abdominal pain   Severe chest pain   Black, tarry stools   Bleeding- more than one tablespoon   Any other symptom or condition that you feel may need urgent attention  Your doctor recommends these additional instructions:  If any biopsies were taken, your doctors clinic will contact you in 1 to 2   weeks with any results.  - Await pathology results.   - Discharge patient to home (with escort).   - Patient has a contact number available for emergencies.  The signs and   symptoms of potential delayed complications were discussed with the   patient.  Return to normal activities tomorrow.  Written discharge   instructions were provided to the patient.   - Resume previous diet.   - Continue present medications.  For questions, problems or results please call your physician - Gaby Brown MD at Work:  [phone].  [facility], EMERGENCY ROOM PHONE NUMBER: [phone]  IF A COMPLICATION OR EMERGENCY SITUATION ARISES AND YOU ARE UNABLE TO REACH   YOUR PHYSICIAN - GO DIRECTLY TO THE EMERGENCY ROOM.  Gaby Brown MD  2/15/2024 1:42:27 PM  This report has been verified and signed electronically.  Dear patient,  As a result of recent federal legislation (The Federal Cures Act), you may   receive lab or pathology results from your procedure in your [facility]   account before your physician is able to contact you. Your physician or   their representative will relay the results to you with their   recommendations at their soonest availability.  Thank you,  [employer]OVATION

## 2024-02-15 NOTE — TRANSFER OF CARE
Anesthesia Transfer of Care Note    Patient: Cornelia Mcdonough    Procedure(s) Performed: Procedure(s) (LRB):  EGD (ESOPHAGOGASTRODUODENOSCOPY) (N/A)    Patient location: PACU    Anesthesia Type: general    Transport from OR: Transported from OR on 2-3 L/min O2 by NC with adequate spontaneous ventilation    Post pain: adequate analgesia    Post assessment: no apparent anesthetic complications and tolerated procedure well    Post vital signs: stable    Level of consciousness: sedated and responds to stimulation    Nausea/Vomiting: no nausea/vomiting    Complications: none    Transfer of care protocol was followed      Last vitals: Visit Vitals  BP (!) 155/74   Pulse 79   Temp 37.1 °C (98.8 °F) (Skin)   Resp 16   SpO2 97%   Breastfeeding No

## 2024-02-15 NOTE — ANESTHESIA PREPROCEDURE EVALUATION
02/15/2024  Cornelia Mcdonough is a 68 y.o., female.      Pre-op Assessment    I have reviewed the Patient Summary Reports.     I have reviewed the Nursing Notes. I have reviewed the NPO Status.   I have reviewed the Medications.     Review of Systems  Anesthesia Hx:  No problems with previous Anesthesia                Cardiovascular:  Cardiovascular Normal                                            Hepatic/GI:    Hiatal Hernia, GERD      Gerd    Hernia, Hiatal Hernia      Neurological:    Neuromuscular Disease,                                 Neuromuscular Disease       Physical Exam  General: Well nourished    Airway:  Mallampati: II   Mouth Opening: Normal  TM Distance: Normal  Neck ROM: Normal ROM        Anesthesia Plan  Type of Anesthesia, risks & benefits discussed:    Anesthesia Type: Gen Natural Airway  Intra-op Monitoring Plan: Standard ASA Monitors  Induction:  IV  Informed Consent: Informed consent signed with the Patient and all parties understand the risks and agree with anesthesia plan.  All questions answered.   ASA Score: 2    Ready For Surgery From Anesthesia Perspective.     .

## 2024-02-15 NOTE — ANESTHESIA POSTPROCEDURE EVALUATION
Anesthesia Post Evaluation    Patient: Cornelia Mcdonough    Procedure(s) Performed: Procedure(s) (LRB):  EGD (ESOPHAGOGASTRODUODENOSCOPY) (N/A)    Final Anesthesia Type: general      Patient location during evaluation: PACU  Patient participation: Yes- Able to Participate  Level of consciousness: awake and alert  Post-procedure vital signs: reviewed and stable  Pain management: adequate  Airway patency: patent    PONV status at discharge: No PONV  Anesthetic complications: no      Cardiovascular status: blood pressure returned to baseline  Respiratory status: unassisted  Hydration status: euvolemic  Follow-up not needed.              Vitals Value Taken Time   /78 02/15/24 1359   Temp 37 °C (98.6 °F) 02/15/24 1359   Pulse 80 02/15/24 1359   Resp 16 02/15/24 1359   SpO2 97 % 02/15/24 1359         Event Time   Out of Recovery 14:25:00         Pain/Garrett Score: Garrett Score: 10 (2/15/2024  1:59 PM)

## 2024-02-15 NOTE — H&P
[facility] Note    CC: Dysphagia, abdominal pain    HPI 68 y.o. female presents for evaluation of abdominal pain and dysphagia    Past Medical History:   Diagnosis Date    Basal cell carcinoma     face and neck    Gallbladder attack     abdominal pain and nausea    Hiatal hernia with GERD        Allergies and Medications reviewed     Review of Systems  General ROS: negative for - chills, fever or weight loss  Cardiovascular ROS: no chest pain or dyspnea on exertion  Gastrointestinal ROS: + abdominal pain    Physical Examination  There were no vitals taken for this visit.  General appearance: alert, cooperative, no distress  HENT: Normocephalic, atraumatic, neck symmetrical, no nasal discharge, sclera anicteric   Lungs: clear to auscultation bilaterally, symmetric chest wall expansion bilaterally  Heart: regular rate and rhythm without rub; no displacement of the PMI   Abdomen: soft  Extremities: extremities symmetric; no clubbing, cyanosis, or edema            Assessment:   68 y.o. female presents for EGD    Plan:  -Proceed to eGD    Gaby Brown MD  [facility]  [address], [address]  [place] [address]  Office: [phone]  Fax: [phone]

## 2024-02-15 NOTE — PLAN OF CARE
1344 Assumed care, vss, see assess. Nadn. Will monitor.   1420 md in to speak with pt.   1425 Pt tolerating po fluids, pain controlled. All belongings returned to pt. D/c instructions given and explained to pt, pt v/u. D/c'd out to pov via wc per staff with chen.

## 2024-02-16 VITALS
DIASTOLIC BLOOD PRESSURE: 78 MMHG | HEART RATE: 80 BPM | SYSTOLIC BLOOD PRESSURE: 146 MMHG | TEMPERATURE: 99 F | RESPIRATION RATE: 16 BRPM | OXYGEN SATURATION: 97 %

## 2024-02-23 ENCOUNTER — HOSPITAL ENCOUNTER (OUTPATIENT)
Dept: RADIOLOGY | Facility: HOSPITAL | Age: 69
Discharge: HOME | End: 2024-02-23
Attending: FAMILY MEDICINE
Payer: MEDICARE

## 2024-02-23 ENCOUNTER — TELEPHONE (OUTPATIENT)
Dept: GASTROENTEROLOGY | Facility: CLINIC | Age: 69
End: 2024-02-23
Payer: MEDICARE

## 2024-02-23 DIAGNOSIS — M54.59 OTHER LOW BACK PAIN: ICD-10-CM

## 2024-02-23 DIAGNOSIS — R20.0 LEFT LEG NUMBNESS: ICD-10-CM

## 2024-02-23 DIAGNOSIS — M54.16 LUMBAR RADICULOPATHY: ICD-10-CM

## 2024-02-23 PROCEDURE — 72148 MRI LUMBAR SPINE W/O DYE: CPT | Mod: 26,,, | Performed by: RADIOLOGY

## 2024-02-23 PROCEDURE — 72148 MRI LUMBAR SPINE W/O DYE: CPT | Mod: TC

## 2024-02-23 NOTE — TELEPHONE ENCOUNTER
Followed up with [facility] medical records dept speaking with Kathleen who stated that although fax confirmation was received on my end, she did not receive medical records request. Fax number verified and received electronic fax number as well. She states fax may be sent to either. Faxed document. Fax confirmation received.

## 2024-02-27 DIAGNOSIS — A04.8 H. PYLORI INFECTION: Primary | ICD-10-CM

## 2024-02-27 RX ORDER — AMOXICILLIN 500 MG/1
1000 TABLET, FILM COATED ORAL 2 TIMES DAILY
Qty: 56 TABLET | Refills: 0 | Status: SHIPPED | OUTPATIENT
Start: 2024-02-27 | End: 2024-03-12

## 2024-02-27 RX ORDER — OMEPRAZOLE 40 MG/1
40 CAPSULE, DELAYED RELEASE ORAL
Qty: 28 CAPSULE | Refills: 0 | Status: SHIPPED | OUTPATIENT
Start: 2024-02-27 | End: 2024-05-14

## 2024-02-27 RX ORDER — CLARITHROMYCIN 500 MG/1
500 TABLET, FILM COATED ORAL 2 TIMES DAILY
Qty: 28 TABLET | Refills: 0 | Status: SHIPPED | OUTPATIENT
Start: 2024-02-27 | End: 2024-03-12

## 2024-02-29 ENCOUNTER — OFFICE VISIT (OUTPATIENT)
Dept: SURGERY | Facility: CLINIC | Age: 69
End: 2024-02-29
Payer: MEDICARE

## 2024-02-29 VITALS
WEIGHT: 136.38 LBS | HEIGHT: 61 IN | RESPIRATION RATE: 16 BRPM | BODY MASS INDEX: 25.75 KG/M2 | SYSTOLIC BLOOD PRESSURE: 150 MMHG | HEART RATE: 76 BPM | TEMPERATURE: 98 F | DIASTOLIC BLOOD PRESSURE: 70 MMHG

## 2024-02-29 DIAGNOSIS — K21.9 GASTROESOPHAGEAL REFLUX DISEASE, UNSPECIFIED WHETHER ESOPHAGITIS PRESENT: ICD-10-CM

## 2024-02-29 DIAGNOSIS — K43.2 INCISIONAL HERNIA WITHOUT OBSTRUCTION OR GANGRENE: Primary | ICD-10-CM

## 2024-02-29 PROCEDURE — 99213 OFFICE O/P EST LOW 20 MIN: CPT | Mod: S$GLB,,, | Performed by: SURGERY

## 2024-02-29 PROCEDURE — 1125F AMNT PAIN NOTED PAIN PRSNT: CPT | Mod: CPTII,S$GLB,, | Performed by: SURGERY

## 2024-02-29 PROCEDURE — 3078F DIAST BP <80 MM HG: CPT | Mod: CPTII,S$GLB,, | Performed by: SURGERY

## 2024-02-29 PROCEDURE — 99999 PR PBB SHADOW E&M-EST. PATIENT-LVL III: CPT | Mod: PBBFAC,,, | Performed by: SURGERY

## 2024-02-29 PROCEDURE — 1101F PT FALLS ASSESS-DOCD LE1/YR: CPT | Mod: CPTII,S$GLB,, | Performed by: SURGERY

## 2024-02-29 PROCEDURE — 3288F FALL RISK ASSESSMENT DOCD: CPT | Mod: CPTII,S$GLB,, | Performed by: SURGERY

## 2024-02-29 PROCEDURE — 3008F BODY MASS INDEX DOCD: CPT | Mod: CPTII,S$GLB,, | Performed by: SURGERY

## 2024-02-29 PROCEDURE — 3077F SYST BP >= 140 MM HG: CPT | Mod: CPTII,S$GLB,, | Performed by: SURGERY

## 2024-02-29 PROCEDURE — 1159F MED LIST DOCD IN RCRD: CPT | Mod: CPTII,S$GLB,, | Performed by: SURGERY

## 2024-02-29 NOTE — PROGRESS NOTES
Just had upper endoscopy with dilation of a Schatzki's ring.  She reports feeling somewhat better which allowed food and pills to pass her esophagus.  She does still report ongoing heartburn and reflux.    Vitals:    02/29/24 1454   BP: (!) 150/70   Pulse: 76   Resp: 16   Temp: 98.2 °F (36.8 °C)     Abdomen benign with incisional hernia    Assessment plan  1.  Incisional hernia   2. Nausea reflux abdominal pain  3. Hiatal hernia moderate with reflux    Robotic hernia repair and robotic hiatal hernia repair at the same time.  I suspect this will help with her dysphagia.  I suspect this will also help with her reflux and abdominal pain.  She is still waiting to see the cardiologist and will return after this is done.    EGD:  Schatzki's ring, small hiatal hernia, gastric polyps, gastritis-dilation done at this procedure  Upper GI:  Moderate hiatal hernia with copious reflux  We will discuss with Dr. Powell for timing

## 2024-03-06 ENCOUNTER — OFFICE VISIT (OUTPATIENT)
Dept: CARDIOLOGY | Facility: CLINIC | Age: 69
End: 2024-03-06
Payer: MEDICARE

## 2024-03-06 ENCOUNTER — TELEPHONE (OUTPATIENT)
Dept: GASTROENTEROLOGY | Facility: CLINIC | Age: 69
End: 2024-03-06
Payer: MEDICARE

## 2024-03-06 VITALS
OXYGEN SATURATION: 98 % | HEIGHT: 61 IN | WEIGHT: 134 LBS | RESPIRATION RATE: 16 BRPM | DIASTOLIC BLOOD PRESSURE: 90 MMHG | HEART RATE: 83 BPM | SYSTOLIC BLOOD PRESSURE: 140 MMHG | BODY MASS INDEX: 25.3 KG/M2

## 2024-03-06 DIAGNOSIS — I10 ESSENTIAL HYPERTENSION: ICD-10-CM

## 2024-03-06 DIAGNOSIS — K21.9 HIATAL HERNIA WITH GERD: ICD-10-CM

## 2024-03-06 DIAGNOSIS — K21.9 GASTROESOPHAGEAL REFLUX DISEASE, UNSPECIFIED WHETHER ESOPHAGITIS PRESENT: ICD-10-CM

## 2024-03-06 DIAGNOSIS — K43.2 INCISIONAL HERNIA WITHOUT OBSTRUCTION OR GANGRENE: ICD-10-CM

## 2024-03-06 DIAGNOSIS — Z01.810 PREOP CARDIOVASCULAR EXAM: Primary | ICD-10-CM

## 2024-03-06 DIAGNOSIS — R94.31 NONSPECIFIC ABNORMAL ELECTROCARDIOGRAM (ECG) (EKG): ICD-10-CM

## 2024-03-06 DIAGNOSIS — K44.9 HIATAL HERNIA WITH GERD: ICD-10-CM

## 2024-03-06 PROCEDURE — 3008F BODY MASS INDEX DOCD: CPT | Mod: CPTII,S$GLB,, | Performed by: INTERNAL MEDICINE

## 2024-03-06 PROCEDURE — 93000 ELECTROCARDIOGRAM COMPLETE: CPT | Mod: S$GLB,,, | Performed by: INTERNAL MEDICINE

## 2024-03-06 PROCEDURE — 1101F PT FALLS ASSESS-DOCD LE1/YR: CPT | Mod: CPTII,S$GLB,, | Performed by: INTERNAL MEDICINE

## 2024-03-06 PROCEDURE — 1126F AMNT PAIN NOTED NONE PRSNT: CPT | Mod: CPTII,S$GLB,, | Performed by: INTERNAL MEDICINE

## 2024-03-06 PROCEDURE — 3080F DIAST BP >= 90 MM HG: CPT | Mod: CPTII,S$GLB,, | Performed by: INTERNAL MEDICINE

## 2024-03-06 PROCEDURE — 99999 PR PBB SHADOW E&M-EST. PATIENT-LVL III: CPT | Mod: PBBFAC,,, | Performed by: INTERNAL MEDICINE

## 2024-03-06 PROCEDURE — 3288F FALL RISK ASSESSMENT DOCD: CPT | Mod: CPTII,S$GLB,, | Performed by: INTERNAL MEDICINE

## 2024-03-06 PROCEDURE — 1159F MED LIST DOCD IN RCRD: CPT | Mod: CPTII,S$GLB,, | Performed by: INTERNAL MEDICINE

## 2024-03-06 PROCEDURE — 1160F RVW MEDS BY RX/DR IN RCRD: CPT | Mod: CPTII,S$GLB,, | Performed by: INTERNAL MEDICINE

## 2024-03-06 PROCEDURE — 99205 OFFICE O/P NEW HI 60 MIN: CPT | Mod: S$GLB,,, | Performed by: INTERNAL MEDICINE

## 2024-03-06 PROCEDURE — 3077F SYST BP >= 140 MM HG: CPT | Mod: CPTII,S$GLB,, | Performed by: INTERNAL MEDICINE

## 2024-03-06 NOTE — PROGRESS NOTES
Subjective:    Patient ID:  Cornelia Mcdonough is a 68 y.o. female     Chief Complaint   Patient presents with    [facility] Care       HPI:  Ms Cornelia Mcdonough is a 68 y.o. female is here for initial consultation.    Patient is basically here because she needs cardiac clearance.  She has been diagnosed with having hiatal hernia she also has abdominal hernia and she had incisional hernia and she also has herniated discs.    She is going to have 2 operations done simultaneously 1 is for hiatal hernia and the 2nd surgery is 4 the abdominal hernia repair.  Patient is doing well no specific complaints at the present time.  Except for the reflux disease and symptoms of reflux patient denies any chest pain or tightness or heaviness her breathing is good denies any shortness a breath or difficulty in breathing denies any exertional chest pain or shortness a breath occasionally she does have palpitations denies any loss of consciousness falls or head injury.  Otherwise patient is stable and not on any major medications.        Review of patient's allergies indicates:   Allergen Reactions    Pyridium [phenazopyridine] Nausea And Vomiting       Past Medical History:   Diagnosis Date    Basal cell carcinoma     face and neck    Gallbladder attack     abdominal pain and nausea    Hiatal hernia with GERD     Sleep apnea      Past Surgical History:   Procedure Laterality Date    BILATERAL TUBAL LIGATION      BLADDER SURGERY      lift, rectocele anterocele, , vaginal vault    COLONOSCOPY      2016    ESOPHAGOGASTRODUODENOSCOPY N/A 2/15/2024    Procedure: EGD (ESOPHAGOGASTRODUODENOSCOPY);  Surgeon: Gaby Brown MD;  Location: [facility];  Service: Endoscopy;  Laterality: N/A;    HYSTERECTOMY      LAPAROSCOPIC CHOLECYSTECTOMY N/A 07/10/2023    Procedure: CHOLECYSTECTOMY, LAPAROSCOPIC;  Surgeon: Cherri Gomez MD;  Location: [facility] OR;  Service: General;  Laterality: N/A;    neck and back surgery      C 5-6 fusion; lumbar  disc L 4-5    UPPER GASTROINTESTINAL ENDOSCOPY      2019     Social History     Tobacco Use    Smoking status: Never     Passive exposure: Never    Smokeless tobacco: Never   Substance Use Topics    Alcohol use: Never    Drug use: Never     Family History   Problem Relation Age of Onset    Cholelithiasis Mother     Colon cancer Neg Hx         Review of Systems:   Constitution: Negative for diaphoresis and fever.   HEENT: Negative for nosebleeds.    Cardiovascular: Negative for chest pain       No dyspnea on exertion       No leg swelling        No palpitations  Respiratory: Negative for shortness of breath and wheezing.    Hematologic/Lymphatic: Negative for bleeding problem. Does not bruise/bleed easily.   Skin: Negative for color change and rash.   Musculoskeletal: Negative for falls and myalgias.   Gastrointestinal: Negative for hematemesis and hematochezia.  Hernia and gastroesophageal reflux disease and she has abdominal hernia.  Genitourinary: Negative for hematuria.   Neurological: Negative for dizziness and light-headedness.   Psychiatric/Behavioral: Negative for altered mental status and memory loss.          Objective:        Vitals:    03/06/24 1456   BP: (!) 140/90   Pulse: 83   Resp: 16       Lab Results   Component Value Date    WBC 4.74 09/21/2023    HGB 11.9 (L) 09/21/2023    HCT 36.7 (L) 09/21/2023     09/21/2023    ALT 32 07/07/2023    AST 19 07/07/2023     07/07/2023    K 3.8 07/07/2023     07/07/2023    CREATININE 0.7 07/07/2023    BUN 18 07/07/2023    CO2 28 07/07/2023        ECHOCARDIOGRAM RESULTS  No results found for this or any previous visit.        CURRENT/PREVIOUS VISIT EKG  Results for orders placed or performed during the hospital encounter of 07/07/23   EKG 12-lead    Collection Time: 07/07/23  7:01 AM    Narrative    Test Reason : Z01.818,    Vent. Rate : 070 BPM     Atrial Rate : 070 BPM     P-R Int : 136 ms          QRS Dur : 080 ms      QT Int : 422 ms        P-R-T Axes : 012 054 049 degrees     QTc Int : 455 ms    Normal sinus rhythm  Normal ECG  No previous ECGs available  Confirmed by Chandrakant Duran MD ([id number]) on 7/16/2023 5:22:53 PM    Referred By:             Confirmed By:Chandrakant Duran MD     No valid procedures specified.   No results found for this or any previous visit.      Physical Exam:  CONSTITUTIONAL: No fever, no chills  HEENT: Normocephalic, atraumatic,pupils reactive to light                 NECK:  No JVD no carotid bruit  CVS: S1S2+, RRR, systolic murmurs,   LUNGS: Clear  ABDOMEN: Soft, NT, BS+  EXTREMITIES: No cyanosis, edema  : No cannon catheter  NEURO: AAO X 3  PSY: Normal affect      Medication List with Changes/Refills   Current Medications    AMOXICILLIN (AMOXIL) 500 MG TAB    Take 2 tablets (1,000 mg total) by mouth 2 (two) times a day. for 14 days    CLARITHROMYCIN (BIAXIN) 500 MG TABLET    Take 1 tablet (500 mg total) by mouth 2 (two) times daily. for 14 days    LEVOCETIRIZINE (XYZAL) 5 MG TABLET    Take 5 mg by mouth.    OMEPRAZOLE (PRILOSEC) 40 MG CAPSULE    Take 1 capsule (40 mg total) by mouth 2 (two) times daily before meals. for 14 days    ONDANSETRON (ZOFRAN-ODT) 4 MG TBDL    Take 1 tablet (4 mg total) by mouth every 6 (six) hours as needed (nv).   Discontinued Medications    COLLAGEN, BOVINE, 100 % POWD    Take 1 tablet by mouth once daily.    FLUCONAZOLE (DIFLUCAN) 200 MG TAB    Take 200 mg by mouth every 7 days.    FLUTICASONE PROPIONATE (FLONASE) 50 MCG/ACTUATION NASAL SPRAY    2 sprays by Each Nostril route.    HYDROCODONE-ACETAMINOPHEN (NORCO) 7.5-325 MG PER TABLET    Take 1 tablet by mouth every 4 (four) hours as needed for Pain.             Assessment:       1. Preop cardiovascular exam    2. Nonspecific abnormal electrocardiogram (ECG) (EKG)    3. Hiatal hernia with GERD    4. Gastroesophageal reflux disease, unspecified whether esophagitis present    5. Incisional hernia without obstruction or gangrene    6. Essential  hypertension         Plan:   1. Preop cardiovascular exam   Patient is being scheduled for abdominal surgery that includes hiatal hernia as well as incisional hernia and the risk for perioperative cardiovascular events goes up higher.  Will schedule her for exercise stress Cardiolite study to evaluate for ischemia.  2. Inferior wall MI on EKG.    Needs further cardiac evaluation including 2D echocardiogram for LV function ejection fraction and wall motion abnormality.  And valvular disease.  3. Gastroesophageal reflux disease   Patient does have symptoms of good as well as intermittent episodes and patient is not sure if that is cardiac in nature or is her good causing the discomfort.  She is currently on omeprazole 40 mg p.o. b.i.d. continue the same.  4. Incisional hernia without obstruction.  Patient has had prior surgeries and now she is in for repair in the area.  5. Essential hypertension  Patient's blood pressure is elevated today is 140/90 currently she has not on any blood pressure medications patient to check her blood pressure at home as soon as she wakes up in the morning, before breakfast, before lunch, before dinner and before going to bed.  And give us the blood pressure readings for 1 week worth.  Will consider medications at that time.  6. EKG  Reviewed EKG independently patient is in normal sinus rhythm with sinus arrhythmia with a heart rate of 83 beats per minute normal intervals and can not rule out inferior infarct age undetermined and nonspecific STT wave changes.  7. I will see her back in the office after the testing has been completed.              Problem List Items Addressed This Visit          GI    Incisional hernia without obstruction or gangrene    Hiatal hernia with GERD     Other Visit Diagnoses       Preop cardiovascular exam    -  Primary    Nonspecific abnormal electrocardiogram (ECG) (EKG)        Relevant Orders    IN OFFICE EKG 12-LEAD (to Muse)    Gastroesophageal reflux  disease, unspecified whether esophagitis present        Essential hypertension                No follow-ups on file.

## 2024-03-06 NOTE — TELEPHONE ENCOUNTER
----- Message from Elisha Rizvi sent at 3/6/2024 10:30 AM CST -----  Regarding: Needs return call  Type: Needs Medical Advice  Who Called:  Cornelia    Evelio Call Back Number: [phone]    Additional Information: Pt did not take her medication correctly and now she says she needs to speak to someone about that please call to advise

## 2024-03-06 NOTE — TELEPHONE ENCOUNTER
Call placed to Ms. Guptaon in regards to message received. She stated she mis read her antibiotic instructions and was only taking 1 table BID. I informed her to start taking 2 tablets BID. She verbalized understanding. No further issues noted.

## 2024-03-12 ENCOUNTER — TREATMENT PLANNING (OUTPATIENT)
Dept: GASTROENTEROLOGY | Facility: CLINIC | Age: 69
End: 2024-03-12
Payer: MEDICARE

## 2024-03-12 NOTE — PROGRESS NOTES
Reviewed medical records received from [facility], summarized below and in medical & surgical history (endoscopies, etc), copies made & given to nurse to be scanned into system:     Procedure:  colonoscopy  Procedure date:  05/02/2016  Attending physician: Dr. Eriberto Irvin    Impression moderate inflammation colon diverticulosis found in the sigmoid colon, healthy-appearing terminal ileum, internal hemorrhoids found    Per Dr. Eriberto Vyas's recommendation colonoscopy to be repeated in 10 years for surveillance purposes    Recommendations:  Next surveillance colonoscopy due on 5/2026  -MANGO Ng

## 2024-03-14 ENCOUNTER — TELEPHONE (OUTPATIENT)
Dept: BARIATRICS | Facility: CLINIC | Age: 69
End: 2024-03-14
Payer: MEDICARE

## 2024-03-14 NOTE — TELEPHONE ENCOUNTER
Called pt. Changed f/u visit to after cardiology appt    ----- Message from Kristen Galeas sent at 3/14/2024  7:33 AM CDT -----  Contact: Self  Type: Needs Medical Advice    Who Called:  Patient  What is this regarding?: She is set up for an apt but the heart Dr is requesting tests done before the surgery.  Best Call Back Number:  [phone]  Additional Information:  Please call the patient back at the phone number listed above to advise. Thank you!

## 2024-04-01 ENCOUNTER — TELEPHONE (OUTPATIENT)
Dept: CARDIOLOGY | Facility: HOSPITAL | Age: 69
End: 2024-04-01

## 2024-04-01 NOTE — TELEPHONE ENCOUNTER
Patient advised, test will be at [facility] ([address]   Will need to register on the first floor at the main entrance.   Patient advised that arrival time is 6:50am.  Patient advised that she may be here about 3.5-4 hours, and may want to bring something to occupy their time, as there will be periods of waiting.    Patient advised, may take her medications prior to testing if you need to.  Advised if she needs to eat to take her medications, please keep it light, like toast and juice.    Patient advised to avoid all caffeine 12 hours prior to testing.  This includes decaf tea and coffee.    Will provide peanut butter crackers for a snack after stress test.  If patient would prefer something else, please bring a snack from home.    Wear comfortable clothing.   No lotions, oils, or powders to the upper chest area. May wear deodorant.    No metal jewelry, buttons, or zippers to the upper body.  Patient verbalizes understanding of instructions.

## 2024-04-02 ENCOUNTER — HOSPITAL ENCOUNTER (OUTPATIENT)
Dept: RADIOLOGY | Facility: HOSPITAL | Age: 69
Discharge: HOME | End: 2024-04-02
Attending: INTERNAL MEDICINE
Payer: MEDICARE

## 2024-04-02 ENCOUNTER — HOSPITAL ENCOUNTER (OUTPATIENT)
Dept: CARDIOLOGY | Facility: HOSPITAL | Age: 69
Discharge: HOME | End: 2024-04-02
Attending: INTERNAL MEDICINE
Payer: MEDICARE

## 2024-04-02 VITALS — HEIGHT: 61 IN | WEIGHT: 134 LBS | BODY MASS INDEX: 25.3 KG/M2

## 2024-04-02 DIAGNOSIS — Z01.810 PREOP CARDIOVASCULAR EXAM: ICD-10-CM

## 2024-04-02 DIAGNOSIS — K21.9 GASTROESOPHAGEAL REFLUX DISEASE, UNSPECIFIED WHETHER ESOPHAGITIS PRESENT: ICD-10-CM

## 2024-04-02 DIAGNOSIS — I10 ESSENTIAL HYPERTENSION: ICD-10-CM

## 2024-04-02 DIAGNOSIS — R94.31 NONSPECIFIC ABNORMAL ELECTROCARDIOGRAM (ECG) (EKG): ICD-10-CM

## 2024-04-02 DIAGNOSIS — K44.9 HIATAL HERNIA WITH GERD: ICD-10-CM

## 2024-04-02 DIAGNOSIS — K21.9 HIATAL HERNIA WITH GERD: ICD-10-CM

## 2024-04-02 DIAGNOSIS — K43.2 INCISIONAL HERNIA WITHOUT OBSTRUCTION OR GANGRENE: ICD-10-CM

## 2024-04-02 LAB
AORTIC ROOT ANNULUS: 2.9 CM
AORTIC VALVE CUSP SEPERATION: 1.9 CM
AV INDEX (PROSTH): 0.89
AV MEAN GRADIENT: 3 MMHG
AV PEAK GRADIENT: 6 MMHG
AV VALVE AREA BY VELOCITY RATIO: 2.12 CM²
AV VALVE AREA: 2.79 CM²
AV VELOCITY RATIO: 0.68
BSA FOR ECHO PROCEDURE: 1.62 M2
CV ECHO LV RWT: 0.46 CM
CV STRESS BASE HR: 71 BPM
DIASTOLIC BLOOD PRESSURE: 86 MMHG
DOP CALC AO PEAK VEL: 1.2 M/S
DOP CALC AO VTI: 27.7 CM
DOP CALC LVOT AREA: 3.1 CM2
DOP CALC LVOT DIAMETER: 2 CM
DOP CALC LVOT PEAK VEL: 0.81 M/S
DOP CALC LVOT STROKE VOLUME: 77.24 CM3
DOP CALCLVOT PEAK VEL VTI: 24.6 CM
E WAVE DECELERATION TIME: 179 MSEC
E/A RATIO: 0.95
E/E' RATIO: 9.47 M/S
ECHO LV POSTERIOR WALL: 0.97 CM (ref 0.6–1.1)
EJECTION FRACTION- HIGH: 65 %
END DIASTOLIC INDEX-HIGH: 153 ML/M2
END DIASTOLIC INDEX-LOW: 93 ML/M2
END SYSTOLIC INDEX-HIGH: 71 ML/M2
END SYSTOLIC INDEX-LOW: 31 ML/M2
FRACTIONAL SHORTENING: 28 % (ref 28–44)
INTERVENTRICULAR SEPTUM: 0.98 CM (ref 0.6–1.1)
IVRT: 95 MSEC
LEFT ATRIUM SIZE: 3.7 CM
LEFT INTERNAL DIMENSION IN SYSTOLE: 3.05 CM (ref 2.1–4)
LEFT VENTRICLE DIASTOLIC VOLUME INDEX: 50.82 ML/M2
LEFT VENTRICLE DIASTOLIC VOLUME: 80.8 ML
LEFT VENTRICLE MASS INDEX: 85 G/M2
LEFT VENTRICLE SYSTOLIC VOLUME INDEX: 22.9 ML/M2
LEFT VENTRICLE SYSTOLIC VOLUME: 36.4 ML
LEFT VENTRICULAR INTERNAL DIMENSION IN DIASTOLE: 4.25 CM (ref 3.5–6)
LEFT VENTRICULAR MASS: 135.02 G
LV LATERAL E/E' RATIO: 8.18 M/S
LV SEPTAL E/E' RATIO: 11.25 M/S
LVOT MG: 1 MMHG
LVOT MV: 0.54 CM/S
MV PEAK A VEL: 0.95 M/S
MV PEAK E VEL: 0.9 M/S
MV STENOSIS PRESSURE HALF TIME: 62 MS
MV VALVE AREA P 1/2 METHOD: 3.55 CM2
NUC REST DIASTOLIC VOLUME INDEX: 54
NUC REST EJECTION FRACTION: 72
NUC REST SYSTOLIC VOLUME INDEX: 15
NUC STRESS DIASTOLIC VOLUME INDEX: 57
NUC STRESS EJECTION FRACTION: 77 %
NUC STRESS SYSTOLIC VOLUME INDEX: 13
OHS CV CPX 1 MINUTE RECOVERY HEART RATE: 116 BPM
OHS CV CPX 85 PERCENT MAX PREDICTED HEART RATE MALE: 130
OHS CV CPX ESTIMATED METS: 7
OHS CV CPX MAX PREDICTED HEART RATE: 153
OHS CV CPX PATIENT IS FEMALE: 1
OHS CV CPX PATIENT IS MALE: 0
OHS CV CPX PEAK DIASTOLIC BLOOD PRESSURE: 86 MMHG
OHS CV CPX PEAK HEAR RATE: 130 BPM
OHS CV CPX PEAK RATE PRESSURE PRODUCT: NORMAL
OHS CV CPX PEAK SYSTOLIC BLOOD PRESSURE: 186 MMHG
OHS CV CPX PERCENT MAX PREDICTED HEART RATE ACHIEVED: 88
OHS CV CPX RATE PRESSURE PRODUCT PRESENTING: NORMAL
PISA TR MAX VEL: 2.26 M/S
RA PRESSURE ESTIMATED: 3 MMHG
RETIRED EF AND QEF - SEE NOTES: 53 %
RIGHT VENTRICULAR END-DIASTOLIC DIMENSION: 2.88 CM
RV TB RVSP: 5 MMHG
STRESS ECHO POST EXERCISE DUR MIN: 6 MINUTES
STRESS ECHO POST EXERCISE DUR SEC: 5 SECONDS
STRESS ST DEPRESSION: 1 MM
SYSTOLIC BLOOD PRESSURE: 172 MMHG
TDI LATERAL: 0.11 M/S
TDI SEPTAL: 0.08 M/S
TDI: 0.1 M/S
TR MAX PG: 20 MMHG
TV REST PULMONARY ARTERY PRESSURE: 23 MMHG
Z-SCORE OF LEFT VENTRICULAR DIMENSION IN END DIASTOLE: -0.66
Z-SCORE OF LEFT VENTRICULAR DIMENSION IN END SYSTOLE: 0.63

## 2024-04-02 PROCEDURE — 78452 HT MUSCLE IMAGE SPECT MULT: CPT

## 2024-04-02 PROCEDURE — 78452 HT MUSCLE IMAGE SPECT MULT: CPT | Mod: 26,,, | Performed by: INTERNAL MEDICINE

## 2024-04-02 PROCEDURE — A9502 TC99M TETROFOSMIN: HCPCS | Performed by: INTERNAL MEDICINE

## 2024-04-02 PROCEDURE — 93306 TTE W/DOPPLER COMPLETE: CPT

## 2024-04-02 PROCEDURE — 93016 CV STRESS TEST SUPVJ ONLY: CPT | Mod: ,,, | Performed by: NURSE PRACTITIONER

## 2024-04-02 PROCEDURE — 93018 CV STRESS TEST I&R ONLY: CPT | Mod: ,,, | Performed by: INTERNAL MEDICINE

## 2024-04-02 PROCEDURE — 93306 TTE W/DOPPLER COMPLETE: CPT | Mod: 26,,, | Performed by: INTERNAL MEDICINE

## 2024-04-02 RX ADMIN — TETROFOSMIN 25.9 MILLICURIE: 1.38 INJECTION, POWDER, LYOPHILIZED, FOR SOLUTION INTRAVENOUS at 08:04

## 2024-04-02 RX ADMIN — TETROFOSMIN 12.3 MILLICURIE: 1.38 INJECTION, POWDER, LYOPHILIZED, FOR SOLUTION INTRAVENOUS at 06:04

## 2024-04-08 ENCOUNTER — OFFICE VISIT (OUTPATIENT)
Dept: CARDIOLOGY | Facility: CLINIC | Age: 69
End: 2024-04-08
Payer: MEDICARE

## 2024-04-08 ENCOUNTER — TELEPHONE (OUTPATIENT)
Dept: CARDIOLOGY | Facility: CLINIC | Age: 69
End: 2024-04-08

## 2024-04-08 ENCOUNTER — TELEPHONE (OUTPATIENT)
Dept: BARIATRICS | Facility: CLINIC | Age: 69
End: 2024-04-08
Payer: MEDICARE

## 2024-04-08 VITALS
HEART RATE: 67 BPM | OXYGEN SATURATION: 98 % | WEIGHT: 139 LBS | RESPIRATION RATE: 16 BRPM | DIASTOLIC BLOOD PRESSURE: 80 MMHG | BODY MASS INDEX: 26.24 KG/M2 | HEIGHT: 61 IN | SYSTOLIC BLOOD PRESSURE: 148 MMHG

## 2024-04-08 DIAGNOSIS — Z01.810 PREOP CARDIOVASCULAR EXAM: Primary | ICD-10-CM

## 2024-04-08 DIAGNOSIS — I10 ESSENTIAL HYPERTENSION: ICD-10-CM

## 2024-04-08 DIAGNOSIS — K43.2 INCISIONAL HERNIA WITHOUT OBSTRUCTION OR GANGRENE: ICD-10-CM

## 2024-04-08 DIAGNOSIS — K21.9 HIATAL HERNIA WITH GERD: ICD-10-CM

## 2024-04-08 DIAGNOSIS — R94.31 ABNORMAL ELECTROCARDIOGRAM (ECG) (EKG): ICD-10-CM

## 2024-04-08 DIAGNOSIS — R07.9 CHEST PAIN, UNSPECIFIED TYPE: Primary | ICD-10-CM

## 2024-04-08 DIAGNOSIS — K44.9 HIATAL HERNIA WITH GERD: ICD-10-CM

## 2024-04-08 DIAGNOSIS — K21.9 GASTROESOPHAGEAL REFLUX DISEASE, UNSPECIFIED WHETHER ESOPHAGITIS PRESENT: ICD-10-CM

## 2024-04-08 DIAGNOSIS — R94.31 NONSPECIFIC ABNORMAL ELECTROCARDIOGRAM (ECG) (EKG): ICD-10-CM

## 2024-04-08 PROCEDURE — 1160F RVW MEDS BY RX/DR IN RCRD: CPT | Mod: CPTII,S$GLB,, | Performed by: INTERNAL MEDICINE

## 2024-04-08 PROCEDURE — 3079F DIAST BP 80-89 MM HG: CPT | Mod: CPTII,S$GLB,, | Performed by: INTERNAL MEDICINE

## 2024-04-08 PROCEDURE — 3077F SYST BP >= 140 MM HG: CPT | Mod: CPTII,S$GLB,, | Performed by: INTERNAL MEDICINE

## 2024-04-08 PROCEDURE — 99999 PR PBB SHADOW E&M-EST. PATIENT-LVL III: CPT | Mod: PBBFAC,,, | Performed by: INTERNAL MEDICINE

## 2024-04-08 PROCEDURE — 99214 OFFICE O/P EST MOD 30 MIN: CPT | Mod: S$GLB,,, | Performed by: INTERNAL MEDICINE

## 2024-04-08 PROCEDURE — 1126F AMNT PAIN NOTED NONE PRSNT: CPT | Mod: CPTII,S$GLB,, | Performed by: INTERNAL MEDICINE

## 2024-04-08 PROCEDURE — 1101F PT FALLS ASSESS-DOCD LE1/YR: CPT | Mod: CPTII,S$GLB,, | Performed by: INTERNAL MEDICINE

## 2024-04-08 PROCEDURE — 3008F BODY MASS INDEX DOCD: CPT | Mod: CPTII,S$GLB,, | Performed by: INTERNAL MEDICINE

## 2024-04-08 PROCEDURE — 1159F MED LIST DOCD IN RCRD: CPT | Mod: CPTII,S$GLB,, | Performed by: INTERNAL MEDICINE

## 2024-04-08 PROCEDURE — 3288F FALL RISK ASSESSMENT DOCD: CPT | Mod: CPTII,S$GLB,, | Performed by: INTERNAL MEDICINE

## 2024-04-08 RX ORDER — METOPROLOL TARTRATE 50 MG/1
TABLET ORAL
Qty: 2 TABLET | Refills: 0 | Status: SHIPPED | OUTPATIENT
Start: 2024-04-08 | End: 2025-04-08

## 2024-04-08 NOTE — TELEPHONE ENCOUNTER
----- Message from Chad Rivera sent at 4/8/2024 11:34 AM CDT -----  Regarding: return call  Contact: patient  Type:  Patient Returning Call    Who Called:patient  Who Left Message for Patient:office nurse  Does the patient know what this is regarding?:no orders being sent to [facility] # [phone]  Would the patient rather a call back or a response via [facility]? Please fax  Best Call Back Number:[phone]  Additional Information:

## 2024-04-08 NOTE — TELEPHONE ENCOUNTER
Returned call to pt. She reports appt with Dr. Duran this AM and he wants further testing (CTA and possible angiography). Cardiac clearance for surgery is pending these results. Rescheduled pt's appt with Dr. Patricia noguera after cardiac testing.     ----- Message from Celeste Mcqueen sent at 4/8/2024  9:51 AM CDT -----  Contact: PT  Type: Needs Medical Advice         Who Called: Pt  Best Call Back Number:[phone]  Additional Information: Requesting a call back regarding  pt is asking if she still needs to come in to visit as there were more testing needed.   Please Advise- Thank you

## 2024-04-08 NOTE — PROGRESS NOTES
Subjective:    Patient ID:  Cornelia Mcdonough is a 68 y.o. female     Chief Complaint   Patient presents with    Results       HPI:  Ms Cornelia Mcdonough is a 68 y.o. female is here for follow-up.    Patient is doing well at the present time.  Her breathing is good denies any shortness a breath or difficulty in breathing denies any chest pain or tightness or heaviness.    Denies any loss of consciousness falls or head injury.  She is taking all her medications regularly.        Review of patient's allergies indicates:   Allergen Reactions    Pyridium [phenazopyridine] Nausea And Vomiting       Past Medical History:   Diagnosis Date    Basal cell carcinoma     face and neck    Gallbladder attack     abdominal pain and nausea    Hiatal hernia with GERD     Sleep apnea      Past Surgical History:   Procedure Laterality Date    BILATERAL TUBAL LIGATION      BLADDER SURGERY      lift, rectocele anterocele, , vaginal vault    COLONOSCOPY      2016    ESOPHAGOGASTRODUODENOSCOPY N/A 2/15/2024    Procedure: EGD (ESOPHAGOGASTRODUODENOSCOPY);  Surgeon: Gaby Brown MD;  Location: [facility];  Service: Endoscopy;  Laterality: N/A;    HYSTERECTOMY      LAPAROSCOPIC CHOLECYSTECTOMY N/A 07/10/2023    Procedure: CHOLECYSTECTOMY, LAPAROSCOPIC;  Surgeon: Cherri Gomez MD;  Location: [facility] OR;  Service: General;  Laterality: N/A;    neck and back surgery      C 5-6 fusion; lumbar disc L 4-5    UPPER GASTROINTESTINAL ENDOSCOPY      2019     Social History     Tobacco Use    Smoking status: Never     Passive exposure: Never    Smokeless tobacco: Never   Substance Use Topics    Alcohol use: Never    Drug use: Never     Family History   Problem Relation Age of Onset    Cholelithiasis Mother     Colon cancer Neg Hx         Review of Systems:   Constitution: Negative for diaphoresis and fever.   HEENT: Negative for nosebleeds.    Cardiovascular: Negative for chest pain       No dyspnea on exertion       No leg swelling        No  palpitations  Respiratory: Negative for shortness of breath and wheezing.    Hematologic/Lymphatic: Negative for bleeding problem. Does not bruise/bleed easily.   Skin: Negative for color change and rash.   Musculoskeletal: Negative for falls and myalgias.   Gastrointestinal: Negative for hematemesis and hematochezia.   Genitourinary: Negative for hematuria.   Neurological: Negative for dizziness and light-headedness.   Psychiatric/Behavioral: Negative for altered mental status and memory loss.          Objective:        Vitals:    04/08/24 0912   BP: (!) 148/80   Pulse: 67   Resp: 16       Lab Results   Component Value Date    WBC 4.74 09/21/2023    HGB 11.9 (L) 09/21/2023    HCT 36.7 (L) 09/21/2023     09/21/2023    ALT 32 07/07/2023    AST 19 07/07/2023     07/07/2023    K 3.8 07/07/2023     07/07/2023    CREATININE 0.7 07/07/2023    BUN 18 07/07/2023    CO2 28 07/07/2023        ECHOCARDIOGRAM RESULTS  Results for orders placed during the hospital encounter of 04/02/24    Echo    Interpretation Summary    Left Ventricle: There is normal systolic function with a visually estimated ejection fraction of 55 - 60%. There is normal diastolic function.    Right Ventricle: Normal right ventricular cavity size. Wall thickness is normal. Right ventricle wall motion  is normal. Systolic function is normal.    Aortic Valve: The aortic valve is a trileaflet valve. Mildly calcified left, right and noncoronary cusps.    Mitral Valve: There is mild regurgitation with a centrally directed jet.    IVC/SVC: Normal venous pressure at 3 mmHg.        CURRENT/PREVIOUS VISIT EKG  Results for orders placed or performed in visit on 03/06/24   IN OFFICE EKG 12-LEAD (to [place])    Collection Time: 03/06/24  2:53 PM   Result Value Ref Range    QRS Duration 80 ms    [facility] QTC Calculation 472 ms    Narrative    Test Reason : R94.31,    Vent. Rate : 083 BPM     Atrial Rate : 083 BPM     P-R Int : 126 ms          QRS Dur : 080  ms      QT Int : 402 ms       P-R-T Axes : 010 063 -06 degrees     QTc Int : 472 ms    Normal sinus rhythm with sinus arrhythmia  Abnormal QRS-T angle, consider primary T wave abnormality  Abnormal ECG  When compared with ECG of 07-JUL-2023 07:01,  Inverted T waves have replaced nonspecific T wave abnormality in Inferior  leads    Referred By: ANTWON RAMOS           Confirmed By:      No valid procedures specified.   Results for orders placed during the hospital encounter of 04/02/24    Nuclear Stress - Cardiology Interpreted    Interpretation Summary    Normal myocardial perfusion scan. There is no evidence of myocardial ischemia or infarction.    The gated perfusion images showed an ejection fraction of 72% at rest. The gated perfusion images showed an ejection fraction of 77% post stress. Normal ejection fraction is greater than 53%.    There is normal wall motion at rest and post stress.    LV cavity size is normal at rest and normal at stress.    The ECG portion of the study is positive for ischemia. Specificity is reduced secondary to resting ST abnormalities.    The patient reported no chest pain during the stress test.    The patient exercised for 6 minutes 5 seconds on a [place] protocol, corresponding to a functional capacity of 7 METS, achieving a peak heart rate of 130 bpm, which is 88 % of the age predicted maximum heart rate.      Physical Exam:  CONSTITUTIONAL: No fever, no chills  HEENT: Normocephalic, atraumatic,pupils reactive to light                 NECK:  No JVD no carotid bruit  CVS: S1S2+, RRR, systolic murmurs,   LUNGS: Clear  ABDOMEN: Soft, NT, BS+  EXTREMITIES: No cyanosis, edema  : No cannon catheter  NEURO: AAO X 3  PSY: Normal affect      Medication List with Changes/Refills   Current Medications    LEVOCETIRIZINE (XYZAL) 5 MG TABLET    Take 5 mg by mouth.    OMEPRAZOLE (PRILOSEC) 40 MG CAPSULE    Take 1 capsule (40 mg total) by mouth 2 (two) times daily before meals. for 14 days    Discontinued Medications    ONDANSETRON (ZOFRAN-ODT) 4 MG TBDL    Take 1 tablet (4 mg total) by mouth every 6 (six) hours as needed (nv).             Assessment:       1. Preop cardiovascular exam    2. Essential hypertension    3. Incisional hernia without obstruction or gangrene    4. Hiatal hernia with GERD    5. Gastroesophageal reflux disease, unspecified whether esophagitis present    6. Nonspecific abnormal electrocardiogram (ECG) (EKG)         Plan:   1. Preop cardiovascular exam.    Patient underwent stress myocardial perfusion study.  Patient has normal myocardial perfusion study without any reversible focal perfusion defect.  The EKG portion of the study is positive.    Given the fact that the EKG portion of the study is positive will do a CTA of the heart/coronary calcification.  Discussed with patient if there is significant coronary calcification she would need coronary angiography.  2. Essential hypertension  Patient's blood pressure is stable at 140 8/80.  She has currently not on any specific blood pressure medications.  Patient had checked her blood pressure as soon as she woke up before breakfast before lunch before dinner and at bedtime and her blood pressure is very well controlled at home.  3. Incisional hernia  Patient is scheduled for surgery.  First will get the CTA of the heart and and then released her for surgery.  4. Patient to continue her current management I will see her back in the office in 6 months time.    5. Discussed with patient the results of the stress test and also the results of the echocardiogram.            Problem List Items Addressed This Visit          GI    Incisional hernia without obstruction or gangrene    Hiatal hernia with GERD     Other Visit Diagnoses       Preop cardiovascular exam    -  Primary    Essential hypertension        Gastroesophageal reflux disease, unspecified whether esophagitis present        Nonspecific abnormal electrocardiogram (ECG) (EKG)                 No follow-ups on file.

## 2024-04-15 ENCOUNTER — TELEPHONE (OUTPATIENT)
Dept: CARDIOLOGY | Facility: CLINIC | Age: 69
End: 2024-04-15
Payer: MEDICARE

## 2024-04-15 NOTE — TELEPHONE ENCOUNTER
----- Message from Onel Servin, Patient Care Assistant sent at 4/15/2024  9:30 AM CDT -----  Contact: Pt  Type: Needs Medical Advice    Who Called: Pt  Best Call Back Number: [phone]  Inquiry/Question: Pt is calling to see if she needs to eat with taking the metoprolol tartrate (LOPRESSOR) 50 MG tablet. Pt is having test done at 8 tomorrow Please advise Thank you~

## 2024-04-19 ENCOUNTER — TELEPHONE (OUTPATIENT)
Dept: GASTROENTEROLOGY | Facility: CLINIC | Age: 69
End: 2024-04-19
Payer: MEDICARE

## 2024-04-19 NOTE — TELEPHONE ENCOUNTER
----- Message from Sarah Munson sent at 4/19/2024 12:36 PM CDT -----  Regarding: stool sample  Contact: pt  Type:  Needs Medical Advice    Who Called: pt    Symptoms (please be specific): stool sample     Best Call Back Number: [phone]    Additional Information: pt asking how to complete stool sample.  Please advise.  Thank you.

## 2024-04-19 NOTE — TELEPHONE ENCOUNTER
Call placed to Ms. Locke in regards to message received. Questions answered on where she can get a stool kit from, and that it can be brought to any [facility] lab. She verbalized understanding. No further issues noted.

## 2024-04-22 ENCOUNTER — LAB VISIT (OUTPATIENT)
Dept: LAB | Facility: CLINIC | Age: 69
End: 2024-04-22
Payer: MEDICARE

## 2024-04-22 DIAGNOSIS — A04.8 H. PYLORI INFECTION: ICD-10-CM

## 2024-04-22 PROCEDURE — 87338 HPYLORI STOOL AG IA: CPT | Performed by: INTERNAL MEDICINE

## 2024-04-30 LAB — H PYLORI AG STL QL IA: NOT DETECTED

## 2024-05-02 LAB
OHS QRS DURATION: 80 MS
OHS QTC CALCULATION: 472 MS

## 2024-05-14 ENCOUNTER — PATIENT MESSAGE (OUTPATIENT)
Dept: SURGERY | Facility: CLINIC | Age: 69
End: 2024-05-14

## 2024-05-14 ENCOUNTER — OFFICE VISIT (OUTPATIENT)
Dept: SURGERY | Facility: CLINIC | Age: 69
End: 2024-05-14
Payer: MEDICARE

## 2024-05-14 VITALS
BODY MASS INDEX: 26.41 KG/M2 | SYSTOLIC BLOOD PRESSURE: 134 MMHG | HEIGHT: 61 IN | WEIGHT: 139.88 LBS | HEART RATE: 74 BPM | TEMPERATURE: 98 F | RESPIRATION RATE: 16 BRPM | DIASTOLIC BLOOD PRESSURE: 62 MMHG

## 2024-05-14 DIAGNOSIS — K43.2 INCISIONAL HERNIA WITHOUT OBSTRUCTION OR GANGRENE: ICD-10-CM

## 2024-05-14 DIAGNOSIS — K21.9 GASTROESOPHAGEAL REFLUX DISEASE, UNSPECIFIED WHETHER ESOPHAGITIS PRESENT: ICD-10-CM

## 2024-05-14 DIAGNOSIS — K44.9 HIATAL HERNIA WITH GERD: Primary | ICD-10-CM

## 2024-05-14 DIAGNOSIS — K21.9 HIATAL HERNIA WITH GERD: Primary | ICD-10-CM

## 2024-05-14 PROCEDURE — 99999 PR PBB SHADOW E&M-EST. PATIENT-LVL III: CPT | Mod: PBBFAC,,, | Performed by: SURGERY

## 2024-05-14 PROCEDURE — 1159F MED LIST DOCD IN RCRD: CPT | Mod: CPTII,S$GLB,, | Performed by: SURGERY

## 2024-05-14 PROCEDURE — 3288F FALL RISK ASSESSMENT DOCD: CPT | Mod: CPTII,S$GLB,, | Performed by: SURGERY

## 2024-05-14 PROCEDURE — 3075F SYST BP GE 130 - 139MM HG: CPT | Mod: CPTII,S$GLB,, | Performed by: SURGERY

## 2024-05-14 PROCEDURE — 1101F PT FALLS ASSESS-DOCD LE1/YR: CPT | Mod: CPTII,S$GLB,, | Performed by: SURGERY

## 2024-05-14 PROCEDURE — 3078F DIAST BP <80 MM HG: CPT | Mod: CPTII,S$GLB,, | Performed by: SURGERY

## 2024-05-14 PROCEDURE — 1125F AMNT PAIN NOTED PAIN PRSNT: CPT | Mod: CPTII,S$GLB,, | Performed by: SURGERY

## 2024-05-14 PROCEDURE — 3008F BODY MASS INDEX DOCD: CPT | Mod: CPTII,S$GLB,, | Performed by: SURGERY

## 2024-05-14 PROCEDURE — 99214 OFFICE O/P EST MOD 30 MIN: CPT | Mod: S$GLB,,, | Performed by: SURGERY

## 2024-05-14 RX ORDER — MELOXICAM 15 MG/1
15 TABLET ORAL DAILY
COMMUNITY

## 2024-05-14 RX ORDER — GABAPENTIN 300 MG/1
300 CAPSULE ORAL NIGHTLY
COMMUNITY

## 2024-05-14 NOTE — PROGRESS NOTES
Consult    Chief Complaint   Patient presents with    Gastroesophageal Reflux    Hernia    Hiatal Hernia       History of Present Illness:  Patient is a 68 y.o. female who is referred for heartburn, reflux, hiatal hernia, and incisional hernia. I have seen her in the past for chronic cholecystitis.  She reports her symptoms are not controlled while on anti acid medications for at least 3 months.  Reflux worse with laying down.  Some dysphagia that improved with dilation of schatzki ring but heartburn worse.      She also has a larger incisional hernia in her lower abdomen that is stuck out and causing some pain as well.  NO gi symptoms from this.    Review of patient's allergies indicates:   Allergen Reactions    Pyridium [phenazopyridine] Nausea And Vomiting       Current Outpatient Medications   Medication Sig Dispense Refill    gabapentin (NEURONTIN) 300 MG capsule Take 300 mg by mouth every evening.      levocetirizine (XYZAL) 5 MG tablet Take 5 mg by mouth.      meloxicam (MOBIC) 15 MG tablet Take 15 mg by mouth once daily.      metoprolol tartrate (LOPRESSOR) 50 MG tablet If heart rate >60 bpm:  take 50 mg tablet 1 hour prior to CTA.  Bring second tab with you to the procedure. 2 tablet 0    metoprolol tartrate (LOPRESSOR) 50 MG tablet If heart rate >60 bpm:  take 50 mg tablet 1 hour prior to CTA.  Bring second tab with you to the procedure. 2 tablet 0    omeprazole (PRILOSEC) 40 MG capsule Take 1 capsule (40 mg total) by mouth 2 (two) times daily before meals. for 14 days 28 capsule 0     No current facility-administered medications for this visit.       Past Medical History:   Diagnosis Date    Basal cell carcinoma     face and neck    Gallbladder attack     abdominal pain and nausea    Hiatal hernia with GERD     Sleep apnea      Past Surgical History:   Procedure Laterality Date    BILATERAL TUBAL LIGATION      BLADDER SURGERY      lift, rectocele anterocele, , vaginal vault    COLONOSCOPY      2016     "ESOPHAGOGASTRODUODENOSCOPY N/A 2/15/2024    Procedure: EGD (ESOPHAGOGASTRODUODENOSCOPY);  Surgeon: Gbay Brown MD;  Location: [facility];  Service: Endoscopy;  Laterality: N/A;    HYSTERECTOMY      LAPAROSCOPIC CHOLECYSTECTOMY N/A 07/10/2023    Procedure: CHOLECYSTECTOMY, LAPAROSCOPIC;  Surgeon: Cherri Gomez MD;  [facility] OR;  Service: General;  Laterality: N/A;    neck and back surgery      C 5-6 fusion; lumbar disc L 4-5    UPPER GASTROINTESTINAL ENDOSCOPY      2019     Family History   Problem Relation Name Age of Onset    Cholelithiasis Mother      Colon cancer Neg Hx       Social History     Tobacco Use    Smoking status: Never     Passive exposure: Never    Smokeless tobacco: Never   Substance Use Topics    Alcohol use: Never    Drug use: Never          Review of Systems   Gastrointestinal:  Positive for abdominal pain.   Musculoskeletal:  Positive for back pain.   All other systems reviewed and are negative.      Physical:     Vital Signs (Most Recent)  Temp: 97.7 °F (36.5 °C) (05/14/24 0906)  Pulse: 74 (05/14/24 0906)  Resp: 16 (05/14/24 0906)  BP: 134/62 (05/14/24 0906)  5' 1" (1.549 m)  63.5 kg (139 lb 14.1 oz)   Physical Exam  Vitals and nursing note reviewed.   Constitutional:       General: She is not in acute distress.     Appearance: She is well-developed. She is not diaphoretic.   HENT:      Head: Normocephalic and atraumatic.      Mouth/Throat:      Pharynx: No oropharyngeal exudate.   Eyes:      General: No scleral icterus.     Conjunctiva/sclera: Conjunctivae normal.      Pupils: Pupils are equal, round, and reactive to light.   Neck:      Thyroid: No thyromegaly.      Vascular: No JVD.      Trachea: No tracheal deviation.   Cardiovascular:      Rate and Rhythm: Normal rate and regular rhythm.      Heart sounds: Normal heart sounds. No murmur heard.     No friction rub. No gallop.   Pulmonary:      Effort: Pulmonary effort is normal. No respiratory distress.      Breath sounds: " Normal breath sounds. No stridor. No wheezing or rales.   Chest:      Chest wall: No tenderness.   Abdominal:      General: Bowel sounds are normal. There is no distension.      Palpations: Abdomen is soft. There is no mass.      Tenderness: There is no abdominal tenderness. There is no guarding or rebound.       Musculoskeletal:         General: No tenderness. Normal range of motion.      Cervical back: Normal range of motion and neck supple.   Lymphadenopathy:      Cervical: No cervical adenopathy.   Skin:     General: Skin is warm and dry.      Findings: No erythema or rash.   Neurological:      Mental Status: She is alert and oriented to person, place, and time.      Cranial Nerves: No cranial nerve deficit.   Psychiatric:         Behavior: Behavior normal.         ASSESSMENT/PLAN:        1. Hiatal hernia with GERD        2. Incisional hernia without obstruction or gangrene        3. Gastroesophageal reflux disease, unspecified whether esophagitis present            UGI with moderate hiatal hernia and copious reflux  EGD with schatzki's ring, hiatal hernia, gastric polyps, gastritis-dilation done at this procedure   Recent CT reviewed showing hiatal hernia    Will plan for hiatal hernia repair with partial fundoplication for mod sized hiatal hernia with reflux that persists despite medical management complicated with schatzki ring.    Plan for incisional hernia repair with Dr. Powell likely at the same time. I have called him to discuss.  He will review her chart and get back to me.

## 2024-05-15 PROBLEM — R94.31 ABNORMAL ELECTROCARDIOGRAM (ECG) (EKG): Status: ACTIVE | Noted: 2024-05-15

## 2024-05-15 PROBLEM — R07.9 CHEST PAIN: Status: ACTIVE | Noted: 2024-05-15

## 2024-05-16 ENCOUNTER — TELEPHONE (OUTPATIENT)
Dept: BARIATRICS | Facility: CLINIC | Age: 69
End: 2024-05-16
Payer: MEDICARE

## 2024-05-16 DIAGNOSIS — K21.9 HIATAL HERNIA WITH GERD: Primary | ICD-10-CM

## 2024-05-16 DIAGNOSIS — K44.9 HIATAL HERNIA WITH GERD: Primary | ICD-10-CM

## 2024-05-16 DIAGNOSIS — K43.2 INCISIONAL HERNIA WITHOUT OBSTRUCTION OR GANGRENE: ICD-10-CM

## 2024-05-16 RX ORDER — CEFAZOLIN SODIUM 2 G/50ML
2 SOLUTION INTRAVENOUS
OUTPATIENT
Start: 2024-05-16

## 2024-05-16 RX ORDER — FAMOTIDINE 10 MG/ML
20 INJECTION INTRAVENOUS
OUTPATIENT
Start: 2024-05-16

## 2024-05-16 NOTE — TELEPHONE ENCOUNTER
----- Message from Patrick Miller sent at 5/16/2024  3:10 PM CDT -----  Regarding: Appt  Contact: [phone]  Patient is calling to speak with someone about appointment she got a text stating she missed an appointment but Dr. Gomez called her around 6 pm. Please contact pt

## 2024-05-16 NOTE — TELEPHONE ENCOUNTER
Called pt to choose surgery date, offered 6/21/24 at [facility]. Pt agreed. Pre-admit and post-op appointments scheduled.

## 2024-05-16 NOTE — TELEPHONE ENCOUNTER
left msg not to worry about the call about a missed appt. We know that the doctor didn't call her until after clinic.

## 2024-05-28 ENCOUNTER — TELEPHONE (OUTPATIENT)
Dept: SURGERY | Facility: CLINIC | Age: 69
End: 2024-05-28
Payer: MEDICARE

## 2024-05-28 NOTE — TELEPHONE ENCOUNTER
Called patient for her to see Dr. Zambrano to get consent signed and case request dropped for the combined surgery with Dr. Gomez on 6/21/24.  Patient scheduled for 5/30/24 at 9:00

## 2024-05-28 NOTE — TELEPHONE ENCOUNTER
----- Message from Donell Arguelles sent at 5/28/2024 12:51 PM CDT -----  Regarding: Prior auth  Good afternoon,    I am processing this upcoming procedure for prior auth. Noted both Dr Gomez and Dr Zambrano are on the surgical request, but there is only one CPT code requested - 83660.    Could you confirm if there are any additional procedure codes required, or if this will cover the whole procedure.    Thank you  Donell Arguelles  [facility]

## 2024-05-30 ENCOUNTER — OFFICE VISIT (OUTPATIENT)
Dept: SURGERY | Facility: CLINIC | Age: 69
End: 2024-05-30
Payer: MEDICARE

## 2024-05-30 VITALS
BODY MASS INDEX: 26.06 KG/M2 | TEMPERATURE: 98 F | WEIGHT: 138 LBS | DIASTOLIC BLOOD PRESSURE: 81 MMHG | SYSTOLIC BLOOD PRESSURE: 166 MMHG | HEART RATE: 75 BPM | HEIGHT: 61 IN

## 2024-05-30 DIAGNOSIS — K43.2 INCISIONAL HERNIA OF ANTERIOR ABDOMINAL WALL WITHOUT OBSTRUCTION OR GANGRENE: ICD-10-CM

## 2024-05-30 DIAGNOSIS — K43.2 INCISIONAL HERNIA WITHOUT OBSTRUCTION OR GANGRENE: Primary | ICD-10-CM

## 2024-05-30 PROCEDURE — 99214 OFFICE O/P EST MOD 30 MIN: CPT | Mod: S$GLB,,, | Performed by: SURGERY

## 2024-05-30 PROCEDURE — 3008F BODY MASS INDEX DOCD: CPT | Mod: CPTII,S$GLB,, | Performed by: SURGERY

## 2024-05-30 PROCEDURE — 1160F RVW MEDS BY RX/DR IN RCRD: CPT | Mod: CPTII,S$GLB,, | Performed by: SURGERY

## 2024-05-30 PROCEDURE — 3077F SYST BP >= 140 MM HG: CPT | Mod: CPTII,S$GLB,, | Performed by: SURGERY

## 2024-05-30 PROCEDURE — 99999 PR PBB SHADOW E&M-EST. PATIENT-LVL V: CPT | Mod: PBBFAC,,, | Performed by: SURGERY

## 2024-05-30 PROCEDURE — 1159F MED LIST DOCD IN RCRD: CPT | Mod: CPTII,S$GLB,, | Performed by: SURGERY

## 2024-05-30 PROCEDURE — 1125F AMNT PAIN NOTED PAIN PRSNT: CPT | Mod: CPTII,S$GLB,, | Performed by: SURGERY

## 2024-05-30 PROCEDURE — 3079F DIAST BP 80-89 MM HG: CPT | Mod: CPTII,S$GLB,, | Performed by: SURGERY

## 2024-05-30 RX ORDER — CEFAZOLIN SODIUM 2 G/50ML
2 SOLUTION INTRAVENOUS
Status: CANCELLED | OUTPATIENT
Start: 2024-05-30

## 2024-05-30 NOTE — H&P (VIEW-ONLY)
Subjective:       Patient ID: Cornelia Mcdonough is a 68 y.o. female.    Chief Complaint: Consult (Incisional hernia)      HPI:      68 year old female referred to the office with complicated ventral hernia. She has had multiple surgeries addressing rectocele and cystocele - at least five. Her most recent operation was in 2018. It included mesh removal and repair. She started to notice bulging at the umbilicus and in the lower abdomen. CT shows 3 cm periumbilical hernia and large lower abdominal wall midline incisional hernia from the previous pfannenstiel incisions. She has no obstructive symptoms. She has sensation of a heavy or hanging feeling in the lower abdomen with standing. CT also showed hiatal hernia and recurrent cystocele and rectocele. She has been in contact with her surgeon regarding the cystocele. She was told that repair could probably be done vaginally if or when it becomes indicated for repair. She has seen Dr. Gomez to discuss hiatal hernia. She is scheduled for hiatal hernia repair June 21.  She has chronic epigastric abdominal pain and reflux symptoms. Symptoms associated with dysphagia as well. She had cholecystectomy July 2023. She has history of Schatzki ring dilatation.She is referred back to the office to discuss ventral hernia repair at time of hiatal hernia repair. No new complaints. Still has bulging lower abdomen without obstructive symptoms.     Swallow study 12/20/23 Impression:  Moderate sliding hiatal hernia with copious gastroesophageal reflux.  Schatzki ring at the GE junction, with delay in passage of a barium tablet into the stomach.    Past Surgical History:   Procedure Laterality Date    BILATERAL TUBAL LIGATION      BLADDER SURGERY      lift, rectocele anterocele, , vaginal vault    COLONOSCOPY      2016    ESOPHAGOGASTRODUODENOSCOPY N/A 2/15/2024    Procedure: EGD (ESOPHAGOGASTRODUODENOSCOPY);  Surgeon: Gaby Brown MD;  Location: [facility];  Service: Endoscopy;   Laterality: N/A;    HYSTERECTOMY      LAPAROSCOPIC CHOLECYSTECTOMY N/A 07/10/2023    Procedure: CHOLECYSTECTOMY, LAPAROSCOPIC;  Surgeon: Cherri Gomez MD;  Location: [facility];  Service: General;  Laterality: N/A;    neck and back surgery      C 5-6 fusion; lumbar disc L 4-5    UPPER GASTROINTESTINAL ENDOSCOPY      2019     Review of patient's allergies indicates:   Allergen Reactions    Pyridium [phenazopyridine] Nausea And Vomiting     Medication List with Changes/Refills   Current Medications    GABAPENTIN (NEURONTIN) 300 MG CAPSULE    Take 300 mg by mouth every evening.    LEVOCETIRIZINE (XYZAL) 5 MG TABLET    Take 5 mg by mouth.    MELOXICAM (MOBIC) 15 MG TABLET    Take 15 mg by mouth once daily.    METOPROLOL TARTRATE (LOPRESSOR) 50 MG TABLET    If heart rate >60 bpm:  take 50 mg tablet 1 hour prior to CTA.  Bring second tab with you to the procedure.    METOPROLOL TARTRATE (LOPRESSOR) 50 MG TABLET    If heart rate >60 bpm:  take 50 mg tablet 1 hour prior to CTA.  Bring second tab with you to the procedure.    OMEPRAZOLE (PRILOSEC) 40 MG CAPSULE    Take 1 capsule (40 mg total) by mouth 2 (two) times daily before meals. for 14 days     Family History   Problem Relation Name Age of Onset    Cholelithiasis Mother      Colon cancer Neg Hx       Social History     Socioeconomic History    Marital status:    Tobacco Use    Smoking status: Never     Passive exposure: Never    Smokeless tobacco: Never   Substance and Sexual Activity    Alcohol use: Never    Drug use: Never         Review of Systems    Objective:      Physical Exam  Constitutional:       General: She is awake. She is not in acute distress.     Appearance: Normal appearance. She is well-developed. She is not toxic-appearing.   HENT:      Head: Normocephalic and atraumatic.   Pulmonary:      Effort: No tachypnea, bradypnea or respiratory distress.   Abdominal:      General: There is no distension.      Palpations: Abdomen is soft.       Tenderness: There is abdominal tenderness. There is no guarding.      Hernia: A hernia is present. Hernia is present in the umbilical area and ventral area.          Comments: Two separate hernias. Mild tenderness with reduction of the lower incisional hernia.    Musculoskeletal:      Cervical back: Neck supple.   Neurological:      Mental Status: She is alert.   Psychiatric:         Behavior: Behavior is cooperative.         Assessment/Plan:   Incisional hernia without obstruction or gangrene  -     Vital signs; Standing  -     Insert peripheral IV; Standing  -     Diet NPO; Standing  -     Place sequential compression device; Standing  -     Pulse Oximetry Q4H; Standing  -     Case Request Operating Room: ROBOTIC REPAIR, HERNIA, VENTRAL  -     Full code; Standing  -     Place in Outpatient; Standing  -     Basic metabolic panel; Future; Expected date: 05/30/2024  -     CBC auto differential; Future; Expected date: 05/30/2024  -     EKG 12-lead; Future  -     X-Ray Chest PA And Lateral; Future; Expected date: 05/30/2024    Incisional hernia of anterior abdominal wall without obstruction or gangrene    Other orders  -     IP VTE LOW RISK PATIENT; Standing  -     cefazolin (ANCEF) 2 gram in dextrose 5% 50 mL IVPB (premix)    Patient has complicated lower abdominal and mid incisional hernia.  Will joint procedure on June 21st an attempt robotic repair.  We discussed that it may require open reduction and fascial repair with or without mesh.  She is comfortable proceeding with both procedures.        I discussed the proposed procedures with the patient including risks, benefits, indications, alternatives and special concerns.  The patient appears to understand and agrees to go ahead with surgery.  I have made no promises, warranties or verbal agreements beyond what was discussed above.    No follow-ups on file.

## 2024-05-30 NOTE — PROGRESS NOTES
Subjective:       Patient ID: Cornelia Mcdonough is a 68 y.o. female.    Chief Complaint: Consult (Incisional hernia)      HPI:      68 year old female referred to the office with complicated ventral hernia. She has had multiple surgeries addressing rectocele and cystocele - at least five. Her most recent operation was in 2018. It included mesh removal and repair. She started to notice bulging at the umbilicus and in the lower abdomen. CT shows 3 cm periumbilical hernia and large lower abdominal wall midline incisional hernia from the previous pfannenstiel incisions. She has no obstructive symptoms. She has sensation of a heavy or hanging feeling in the lower abdomen with standing. CT also showed hiatal hernia and recurrent cystocele and rectocele. She has been in contact with her surgeon regarding the cystocele. She was told that repair could probably be done vaginally if or when it becomes indicated for repair. She has seen Dr. Gomez to discuss hiatal hernia. She is scheduled for hiatal hernia repair June 21.  She has chronic epigastric abdominal pain and reflux symptoms. Symptoms associated with dysphagia as well. She had cholecystectomy July 2023. She has history of Schatzki ring dilatation.She is referred back to the office to discuss ventral hernia repair at time of hiatal hernia repair. No new complaints. Still has bulging lower abdomen without obstructive symptoms.     Swallow study 12/20/23 Impression:  Moderate sliding hiatal hernia with copious gastroesophageal reflux.  Schatzki ring at the GE junction, with delay in passage of a barium tablet into the stomach.    Past Surgical History:   Procedure Laterality Date    BILATERAL TUBAL LIGATION      BLADDER SURGERY      lift, rectocele anterocele, , vaginal vault    COLONOSCOPY      2016    ESOPHAGOGASTRODUODENOSCOPY N/A 2/15/2024    Procedure: EGD (ESOPHAGOGASTRODUODENOSCOPY);  Surgeon: Gaby Brown MD;  Location: [facility];  Service: Endoscopy;   Laterality: N/A;    HYSTERECTOMY      LAPAROSCOPIC CHOLECYSTECTOMY N/A 07/10/2023    Procedure: CHOLECYSTECTOMY, LAPAROSCOPIC;  Surgeon: Cherri Gomez MD;  Location: [facility];  Service: General;  Laterality: N/A;    neck and back surgery      C 5-6 fusion; lumbar disc L 4-5    UPPER GASTROINTESTINAL ENDOSCOPY      2019     Review of patient's allergies indicates:   Allergen Reactions    Pyridium [phenazopyridine] Nausea And Vomiting     Medication List with Changes/Refills   Current Medications    GABAPENTIN (NEURONTIN) 300 MG CAPSULE    Take 300 mg by mouth every evening.    LEVOCETIRIZINE (XYZAL) 5 MG TABLET    Take 5 mg by mouth.    MELOXICAM (MOBIC) 15 MG TABLET    Take 15 mg by mouth once daily.    METOPROLOL TARTRATE (LOPRESSOR) 50 MG TABLET    If heart rate >60 bpm:  take 50 mg tablet 1 hour prior to CTA.  Bring second tab with you to the procedure.    METOPROLOL TARTRATE (LOPRESSOR) 50 MG TABLET    If heart rate >60 bpm:  take 50 mg tablet 1 hour prior to CTA.  Bring second tab with you to the procedure.    OMEPRAZOLE (PRILOSEC) 40 MG CAPSULE    Take 1 capsule (40 mg total) by mouth 2 (two) times daily before meals. for 14 days     Family History   Problem Relation Name Age of Onset    Cholelithiasis Mother      Colon cancer Neg Hx       Social History     Socioeconomic History    Marital status:    Tobacco Use    Smoking status: Never     Passive exposure: Never    Smokeless tobacco: Never   Substance and Sexual Activity    Alcohol use: Never    Drug use: Never         Review of Systems    Objective:      Physical Exam  Constitutional:       General: She is awake. She is not in acute distress.     Appearance: Normal appearance. She is well-developed. She is not toxic-appearing.   HENT:      Head: Normocephalic and atraumatic.   Pulmonary:      Effort: No tachypnea, bradypnea or respiratory distress.   Abdominal:      General: There is no distension.      Palpations: Abdomen is soft.       Tenderness: There is abdominal tenderness. There is no guarding.      Hernia: A hernia is present. Hernia is present in the umbilical area and ventral area.          Comments: Two separate hernias. Mild tenderness with reduction of the lower incisional hernia.    Musculoskeletal:      Cervical back: Neck supple.   Neurological:      Mental Status: She is alert.   Psychiatric:         Behavior: Behavior is cooperative.         Assessment/Plan:   Incisional hernia without obstruction or gangrene  -     Vital signs; Standing  -     Insert peripheral IV; Standing  -     Diet NPO; Standing  -     Place sequential compression device; Standing  -     Pulse Oximetry Q4H; Standing  -     Case Request Operating Room: ROBOTIC REPAIR, HERNIA, VENTRAL  -     Full code; Standing  -     Place in Outpatient; Standing  -     Basic metabolic panel; Future; Expected date: 05/30/2024  -     CBC auto differential; Future; Expected date: 05/30/2024  -     EKG 12-lead; Future  -     X-Ray Chest PA And Lateral; Future; Expected date: 05/30/2024    Incisional hernia of anterior abdominal wall without obstruction or gangrene    Other orders  -     IP VTE LOW RISK PATIENT; Standing  -     cefazolin (ANCEF) 2 gram in dextrose 5% 50 mL IVPB (premix)    Patient has complicated lower abdominal and mid incisional hernia.  Will joint procedure on June 21st an attempt robotic repair.  We discussed that it may require open reduction and fascial repair with or without mesh.  She is comfortable proceeding with both procedures.        I discussed the proposed procedures with the patient including risks, benefits, indications, alternatives and special concerns.  The patient appears to understand and agrees to go ahead with surgery.  I have made no promises, warranties or verbal agreements beyond what was discussed above.    No follow-ups on file.

## 2024-06-20 ENCOUNTER — HOSPITAL ENCOUNTER (OUTPATIENT)
Dept: RADIOLOGY | Facility: HOSPITAL | Age: 69
Discharge: HOME | End: 2024-06-20
Attending: ORTHOPAEDIC SURGERY
Payer: MEDICARE

## 2024-06-20 ENCOUNTER — HOSPITAL ENCOUNTER (OUTPATIENT)
Dept: PREADMISSION TESTING | Facility: HOSPITAL | Age: 69
Discharge: HOME | End: 2024-06-20
Attending: SURGERY
Payer: MEDICARE

## 2024-06-20 ENCOUNTER — ANESTHESIA EVENT (OUTPATIENT)
Dept: SURGERY | Facility: HOSPITAL | Age: 69
DRG: 327 | End: 2024-06-20
Payer: MEDICARE

## 2024-06-20 DIAGNOSIS — K43.2 INCISIONAL HERNIA WITHOUT OBSTRUCTION OR GANGRENE: ICD-10-CM

## 2024-06-20 PROCEDURE — 71046 X-RAY EXAM CHEST 2 VIEWS: CPT | Mod: 26,,, | Performed by: RADIOLOGY

## 2024-06-20 PROCEDURE — 71046 X-RAY EXAM CHEST 2 VIEWS: CPT | Mod: TC

## 2024-06-20 RX ORDER — FLUCONAZOLE 50 MG/1
50 TABLET ORAL WEEKLY
COMMUNITY

## 2024-06-20 NOTE — DISCHARGE INSTRUCTIONS
To confirm, Your doctor has instructed you that surgery is scheduled for: 6/21/24    Please report to [facility], Registration the morning of surgery. You must check-in and receive a wristband before going to your procedure.  [address] [place] [address] [place] [id number]    Pre-Op will call the afternoon prior to surgery between 1:00 and 6:00 PM with the final arrival time.  Phone number: [phone]    PLEASE NOTE:  The surgery schedule has many variables which may affect the time of your surgery case.  Family members should be available if your surgery time changes.  Plan to be here the day of your procedure between 4-6 hours.    MEDICATIONS:  TAKE ONLY THESE MEDICATIONS WITH A SMALL SIP OF WATER THE MORNING OF YOUR PROCEDURE:    SEE MED LIST          DO NOT TAKE THESE MEDICATIONS 5-7 DAYS PRIOR to your procedure or per your surgeon's request:   ASPIRIN, ALEVE, ADVIL, IBUPROFEN, FISH OIL VITAMIN E, HERBALS  (May take Tylenol)    ONLY if you are prescribed any types of blood thinners such as:  Aspirin, Coumadin, Plavix, Pradaxa, Xarelto, Aggrenox, Effient, Eliquis, Savasya, Brilinta, or any other, ask your surgeon whether you should stop taking them and how long before surgery you should stop.  You may also need to verify with the prescribing physician if it is ok to stop your medication.      INSTRUCTIONS IMPORTANT!!  Do not eat or drink anything between midnight and the time of your procedure- this includes gum, mints, and candy.  Do not smoke or drink alcoholic beverages 24 hours prior to your procedure.  Shower the night before AND the morning of your procedure with a Chlorhexidine wash such as Hibiclens or Dial antibacterial soap from the neck down.  Do not get it on your face or in your eyes.  You may use your own shampoo and face wash. This helps your skin to be as bacteria free as possible.    If you wear contact lenses, dentures, hearing aids or glasses, bring a container to put them in  during surgery and give to a family member for safe keeping.  Please leave all jewelry, piercing's and valuables at home. You must remove your false eyelashes prior to surgery.    DO NOT remove hair from the surgery site.  Do not shave the incision site unless you are given specific instructions to do so.    ONLY if you have been diagnosed with sleep apnea please bring your C-PAP machine.  ONLY if you wear home oxygen please bring your portable oxygen tank the day of your procedure.  ONLY if you have a history of OPEN HEART SURGERY you will need a clearance from your Cardiologist per Anesthesia.      ONLY for patients requiring bowel prep, written instructions will be given by your doctor's office.  ONLY if you have a neuro stimulator, please bring the controller with you the morning of surgery  ONLY if a type and screen test is needed before surgery, please return:  If your doctor has scheduled you for an overnight stay, bring a small overnight bag with any personal items you need.  Make arrangements in advance for transportation home by a responsible adult. You can not go home in an uber or a cab per hospital policy.  It is not safe to drive a vehicle during the 24 hours after anesthesia.          All  facilities and properties are tobacco free.  Smoking is NOT allowed.   If you have any questions about these instructions, call Pre-Op Admit  Nursing at [phone] or the Pre-Op Day Surgery Unit at [phone].

## 2024-06-21 ENCOUNTER — ANESTHESIA (OUTPATIENT)
Dept: SURGERY | Facility: HOSPITAL | Age: 69
DRG: 327 | End: 2024-06-21
Payer: MEDICARE

## 2024-06-21 ENCOUNTER — HOSPITAL ENCOUNTER (INPATIENT)
Facility: HOSPITAL | Age: 69
LOS: 1 days | Discharge: HOME | DRG: 327 | End: 2024-06-22
Attending: SURGERY | Admitting: SURGERY
Payer: MEDICARE

## 2024-06-21 DIAGNOSIS — K43.2 INCISIONAL HERNIA WITHOUT OBSTRUCTION OR GANGRENE: Primary | ICD-10-CM

## 2024-06-21 DIAGNOSIS — K21.9 HIATAL HERNIA WITH GERD: ICD-10-CM

## 2024-06-21 DIAGNOSIS — K44.9 HIATAL HERNIA WITH GERD: ICD-10-CM

## 2024-06-21 PROCEDURE — 11000001 HC ACUTE MED/SURG PRIVATE ROOM

## 2024-06-21 PROCEDURE — 0DV44ZZ RESTRICTION OF ESOPHAGOGASTRIC JUNCTION, PERCUTANEOUS ENDOSCOPIC APPROACH: ICD-10-PCS | Performed by: SURGERY

## 2024-06-21 PROCEDURE — 25000003 PHARM REV CODE 250: Performed by: NURSE ANESTHETIST, CERTIFIED REGISTERED

## 2024-06-21 PROCEDURE — 27201423 OPTIME MED/SURG SUP & DEVICES STERILE SUPPLY: Performed by: SURGERY

## 2024-06-21 PROCEDURE — 36000712 HC OR TIME LEV V 1ST 15 MIN: Performed by: SURGERY

## 2024-06-21 PROCEDURE — C1781 MESH (IMPLANTABLE): HCPCS | Performed by: SURGERY

## 2024-06-21 PROCEDURE — 37000008 HC ANESTHESIA 1ST 15 MINUTES: Performed by: SURGERY

## 2024-06-21 PROCEDURE — 71000039 HC RECOVERY, EACH ADD'L HOUR: Performed by: SURGERY

## 2024-06-21 PROCEDURE — 8E0W4CZ ROBOTIC ASSISTED PROCEDURE OF TRUNK REGION, PERCUTANEOUS ENDOSCOPIC APPROACH: ICD-10-PCS | Performed by: SURGERY

## 2024-06-21 PROCEDURE — 25000003 PHARM REV CODE 250: Performed by: ANESTHESIOLOGY

## 2024-06-21 PROCEDURE — C9290 INJ, BUPIVACAINE LIPOSOME: HCPCS | Performed by: SURGERY

## 2024-06-21 PROCEDURE — 37000009 HC ANESTHESIA EA ADD 15 MINS: Performed by: SURGERY

## 2024-06-21 PROCEDURE — 94760 N-INVAS EAR/PLS OXIMETRY 1: CPT

## 2024-06-21 PROCEDURE — 94799 UNLISTED PULMONARY SVC/PX: CPT

## 2024-06-21 PROCEDURE — 0BQT4ZZ REPAIR DIAPHRAGM, PERCUTANEOUS ENDOSCOPIC APPROACH: ICD-10-PCS | Performed by: SURGERY

## 2024-06-21 PROCEDURE — 0WUF4JZ SUPPLEMENT ABDOMINAL WALL WITH SYNTHETIC SUBSTITUTE, PERCUTANEOUS ENDOSCOPIC APPROACH: ICD-10-PCS | Performed by: SURGERY

## 2024-06-21 PROCEDURE — 36000713 HC OR TIME LEV V EA ADD 15 MIN: Performed by: SURGERY

## 2024-06-21 PROCEDURE — 71000033 HC RECOVERY, INTIAL HOUR: Performed by: SURGERY

## 2024-06-21 PROCEDURE — 63600175 PHARM REV CODE 636 W HCPCS: Performed by: NURSE ANESTHETIST, CERTIFIED REGISTERED

## 2024-06-21 PROCEDURE — 27000221 HC OXYGEN, UP TO 24 HOURS

## 2024-06-21 PROCEDURE — 25000003 PHARM REV CODE 250: Performed by: SURGERY

## 2024-06-21 PROCEDURE — 63600175 PHARM REV CODE 636 W HCPCS: Performed by: SURGERY

## 2024-06-21 PROCEDURE — 94799 UNLISTED PULMONARY SVC/PX: CPT | Mod: XB

## 2024-06-21 PROCEDURE — 99900035 HC TECH TIME PER 15 MIN (STAT)

## 2024-06-21 PROCEDURE — 63600175 PHARM REV CODE 636 W HCPCS: Mod: JZ,JG | Performed by: SURGERY

## 2024-06-21 DEVICE — MESH PARIETENE COMP RND 12CM: Type: IMPLANTABLE DEVICE | Site: ABDOMEN | Status: FUNCTIONAL

## 2024-06-21 RX ORDER — ONDANSETRON HYDROCHLORIDE 2 MG/ML
INJECTION, SOLUTION INTRAMUSCULAR; INTRAVENOUS
Status: DISCONTINUED | OUTPATIENT
Start: 2024-06-21 | End: 2024-06-21

## 2024-06-21 RX ORDER — ACETAMINOPHEN 10 MG/ML
INJECTION, SOLUTION INTRAVENOUS
Status: DISCONTINUED | OUTPATIENT
Start: 2024-06-21 | End: 2024-06-21

## 2024-06-21 RX ORDER — ENOXAPARIN SODIUM 100 MG/ML
40 INJECTION SUBCUTANEOUS EVERY 24 HOURS
Status: DISCONTINUED | OUTPATIENT
Start: 2024-06-22 | End: 2024-06-22 | Stop reason: HOSPADM

## 2024-06-21 RX ORDER — NEOSTIGMINE METHYLSULFATE 1 MG/ML
INJECTION, SOLUTION INTRAVENOUS
Status: DISCONTINUED | OUTPATIENT
Start: 2024-06-21 | End: 2024-06-21

## 2024-06-21 RX ORDER — OXYCODONE HYDROCHLORIDE 5 MG/1
5 TABLET ORAL
Status: DISCONTINUED | OUTPATIENT
Start: 2024-06-21 | End: 2024-06-21 | Stop reason: HOSPADM

## 2024-06-21 RX ORDER — DIPHENHYDRAMINE HYDROCHLORIDE 50 MG/ML
12.5 INJECTION INTRAMUSCULAR; INTRAVENOUS EVERY 4 HOURS PRN
Status: DISCONTINUED | OUTPATIENT
Start: 2024-06-21 | End: 2024-06-22 | Stop reason: HOSPADM

## 2024-06-21 RX ORDER — ONDANSETRON HYDROCHLORIDE 2 MG/ML
8 INJECTION, SOLUTION INTRAVENOUS EVERY 6 HOURS PRN
Status: DISCONTINUED | OUTPATIENT
Start: 2024-06-21 | End: 2024-06-22 | Stop reason: HOSPADM

## 2024-06-21 RX ORDER — FAMOTIDINE 10 MG/ML
20 INJECTION INTRAVENOUS
Status: COMPLETED | OUTPATIENT
Start: 2024-06-21 | End: 2024-06-21

## 2024-06-21 RX ORDER — ONDANSETRON HYDROCHLORIDE 2 MG/ML
4 INJECTION, SOLUTION INTRAVENOUS ONCE AS NEEDED
Status: DISCONTINUED | OUTPATIENT
Start: 2024-06-21 | End: 2024-06-21 | Stop reason: HOSPADM

## 2024-06-21 RX ORDER — BUPIVACAINE HYDROCHLORIDE 2.5 MG/ML
INJECTION, SOLUTION EPIDURAL; INFILTRATION; INTRACAUDAL
Status: DISCONTINUED | OUTPATIENT
Start: 2024-06-21 | End: 2024-06-21 | Stop reason: HOSPADM

## 2024-06-21 RX ORDER — SODIUM CHLORIDE, SODIUM LACTATE, POTASSIUM CHLORIDE, CALCIUM CHLORIDE 600; 310; 30; 20 MG/100ML; MG/100ML; MG/100ML; MG/100ML
INJECTION, SOLUTION INTRAVENOUS CONTINUOUS
Status: DISCONTINUED | OUTPATIENT
Start: 2024-06-21 | End: 2024-06-22 | Stop reason: HOSPADM

## 2024-06-21 RX ORDER — LIDOCAINE HYDROCHLORIDE 10 MG/ML
1 INJECTION, SOLUTION EPIDURAL; INFILTRATION; INTRACAUDAL; PERINEURAL ONCE
Status: DISCONTINUED | OUTPATIENT
Start: 2024-06-21 | End: 2024-06-22 | Stop reason: HOSPADM

## 2024-06-21 RX ORDER — DIPHENHYDRAMINE HYDROCHLORIDE 50 MG/ML
12.5 INJECTION INTRAMUSCULAR; INTRAVENOUS EVERY 6 HOURS PRN
Status: DISCONTINUED | OUTPATIENT
Start: 2024-06-21 | End: 2024-06-21 | Stop reason: HOSPADM

## 2024-06-21 RX ORDER — LIDOCAINE HYDROCHLORIDE 20 MG/ML
INJECTION INTRAVENOUS
Status: DISCONTINUED | OUTPATIENT
Start: 2024-06-21 | End: 2024-06-21

## 2024-06-21 RX ORDER — OXYCODONE HYDROCHLORIDE 10 MG/1
10 TABLET ORAL EVERY 4 HOURS PRN
Status: DISCONTINUED | OUTPATIENT
Start: 2024-06-21 | End: 2024-06-22 | Stop reason: HOSPADM

## 2024-06-21 RX ORDER — SUCCINYLCHOLINE CHLORIDE 20 MG/ML
INJECTION INTRAMUSCULAR; INTRAVENOUS
Status: DISCONTINUED | OUTPATIENT
Start: 2024-06-21 | End: 2024-06-21

## 2024-06-21 RX ORDER — KETAMINE HCL IN 0.9 % NACL 50 MG/5 ML
SYRINGE (ML) INTRAVENOUS
Status: DISCONTINUED | OUTPATIENT
Start: 2024-06-21 | End: 2024-06-21

## 2024-06-21 RX ORDER — MEPERIDINE HYDROCHLORIDE 50 MG/ML
12.5 INJECTION INTRAMUSCULAR; INTRAVENOUS; SUBCUTANEOUS ONCE AS NEEDED
Status: DISCONTINUED | OUTPATIENT
Start: 2024-06-21 | End: 2024-06-21 | Stop reason: HOSPADM

## 2024-06-21 RX ORDER — PANTOPRAZOLE SODIUM 40 MG/1
40 TABLET, DELAYED RELEASE ORAL DAILY
Status: DISCONTINUED | OUTPATIENT
Start: 2024-06-22 | End: 2024-06-22 | Stop reason: HOSPADM

## 2024-06-21 RX ORDER — PROPOFOL 10 MG/ML
VIAL (ML) INTRAVENOUS
Status: DISCONTINUED | OUTPATIENT
Start: 2024-06-21 | End: 2024-06-21

## 2024-06-21 RX ORDER — MIDAZOLAM HYDROCHLORIDE 1 MG/ML
INJECTION INTRAMUSCULAR; INTRAVENOUS
Status: DISCONTINUED | OUTPATIENT
Start: 2024-06-21 | End: 2024-06-21

## 2024-06-21 RX ORDER — EPHEDRINE SULFATE 50 MG/ML
INJECTION, SOLUTION INTRAVENOUS
Status: DISCONTINUED | OUTPATIENT
Start: 2024-06-21 | End: 2024-06-21

## 2024-06-21 RX ORDER — ROCURONIUM BROMIDE 10 MG/ML
INJECTION, SOLUTION INTRAVENOUS
Status: DISCONTINUED | OUTPATIENT
Start: 2024-06-21 | End: 2024-06-21

## 2024-06-21 RX ORDER — HYDROMORPHONE HYDROCHLORIDE 1 MG/ML
1 INJECTION, SOLUTION INTRAMUSCULAR; INTRAVENOUS; SUBCUTANEOUS EVERY 6 HOURS PRN
Status: DISCONTINUED | OUTPATIENT
Start: 2024-06-21 | End: 2024-06-22 | Stop reason: HOSPADM

## 2024-06-21 RX ORDER — FENTANYL CITRATE 50 UG/ML
INJECTION, SOLUTION INTRAMUSCULAR; INTRAVENOUS
Status: DISCONTINUED | OUTPATIENT
Start: 2024-06-21 | End: 2024-06-21

## 2024-06-21 RX ORDER — GABAPENTIN 300 MG/1
300 CAPSULE ORAL NIGHTLY
Status: DISCONTINUED | OUTPATIENT
Start: 2024-06-21 | End: 2024-06-22 | Stop reason: HOSPADM

## 2024-06-21 RX ORDER — HYDROMORPHONE HYDROCHLORIDE 2 MG/ML
0.2 INJECTION, SOLUTION INTRAMUSCULAR; INTRAVENOUS; SUBCUTANEOUS EVERY 5 MIN PRN
Status: DISCONTINUED | OUTPATIENT
Start: 2024-06-21 | End: 2024-06-21 | Stop reason: HOSPADM

## 2024-06-21 RX ORDER — DEXAMETHASONE SODIUM PHOSPHATE 4 MG/ML
INJECTION, SOLUTION INTRA-ARTICULAR; INTRALESIONAL; INTRAMUSCULAR; INTRAVENOUS; SOFT TISSUE
Status: DISCONTINUED | OUTPATIENT
Start: 2024-06-21 | End: 2024-06-21

## 2024-06-21 RX ORDER — FENTANYL CITRATE 50 UG/ML
25 INJECTION, SOLUTION INTRAMUSCULAR; INTRAVENOUS EVERY 5 MIN PRN
Status: DISCONTINUED | OUTPATIENT
Start: 2024-06-21 | End: 2024-06-21 | Stop reason: HOSPADM

## 2024-06-21 RX ORDER — LIDOCAINE HYDROCHLORIDE 20 MG/ML
JELLY TOPICAL
Status: DISCONTINUED | OUTPATIENT
Start: 2024-06-21 | End: 2024-06-21

## 2024-06-21 RX ADMIN — SUCCINYLCHOLINE CHLORIDE 120 MG: 20 INJECTION, SOLUTION INTRAMUSCULAR; INTRAVENOUS at 02:06

## 2024-06-21 RX ADMIN — SODIUM CHLORIDE, POTASSIUM CHLORIDE, SODIUM LACTATE AND CALCIUM CHLORIDE: 600; 310; 30; 20 INJECTION, SOLUTION INTRAVENOUS at 06:06

## 2024-06-21 RX ADMIN — ONDANSETRON 4 MG: 2 INJECTION INTRAMUSCULAR; INTRAVENOUS at 02:06

## 2024-06-21 RX ADMIN — GABAPENTIN 300 MG: 300 CAPSULE ORAL at 08:06

## 2024-06-21 RX ADMIN — EPHEDRINE SULFATE 10 MG: 50 INJECTION, SOLUTION INTRAMUSCULAR; INTRAVENOUS; SUBCUTANEOUS at 02:06

## 2024-06-21 RX ADMIN — FENTANYL CITRATE 50 MCG: 50 INJECTION, SOLUTION INTRAMUSCULAR; INTRAVENOUS at 06:06

## 2024-06-21 RX ADMIN — ACETAMINOPHEN 1000 MG: 10 INJECTION, SOLUTION INTRAVENOUS at 03:06

## 2024-06-21 RX ADMIN — MIDAZOLAM HYDROCHLORIDE 2 MG: 1 INJECTION, SOLUTION INTRAMUSCULAR; INTRAVENOUS at 02:06

## 2024-06-21 RX ADMIN — PROPOFOL 140 MG: 10 INJECTION, EMULSION INTRAVENOUS at 02:06

## 2024-06-21 RX ADMIN — FAMOTIDINE 20 MG: 10 INJECTION, SOLUTION INTRAVENOUS at 10:06

## 2024-06-21 RX ADMIN — ROCURONIUM BROMIDE 30 MG: 10 INJECTION, SOLUTION INTRAVENOUS at 02:06

## 2024-06-21 RX ADMIN — NEOSTIGMINE METHYLSULFATE 4 MG: 1 INJECTION INTRAVENOUS at 06:06

## 2024-06-21 RX ADMIN — FENTANYL CITRATE 100 MCG: 50 INJECTION, SOLUTION INTRAMUSCULAR; INTRAVENOUS at 02:06

## 2024-06-21 RX ADMIN — CEFAZOLIN 2 G: 2 INJECTION, POWDER, FOR SOLUTION INTRAMUSCULAR; INTRAVENOUS at 02:06

## 2024-06-21 RX ADMIN — LIDOCAINE HYDROCHLORIDE 100 MG: 20 INJECTION, SOLUTION INTRAVENOUS at 02:06

## 2024-06-21 RX ADMIN — ROCURONIUM BROMIDE 10 MG: 10 INJECTION, SOLUTION INTRAVENOUS at 05:06

## 2024-06-21 RX ADMIN — SODIUM CHLORIDE, SODIUM GLUCONATE, SODIUM ACETATE, POTASSIUM CHLORIDE AND MAGNESIUM CHLORIDE: 526; 502; 368; 37; 30 INJECTION, SOLUTION INTRAVENOUS at 02:06

## 2024-06-21 RX ADMIN — PROPOFOL 50 MG: 10 INJECTION, EMULSION INTRAVENOUS at 02:06

## 2024-06-21 RX ADMIN — SODIUM CHLORIDE, SODIUM GLUCONATE, SODIUM ACETATE, POTASSIUM CHLORIDE AND MAGNESIUM CHLORIDE: 526; 502; 368; 37; 30 INJECTION, SOLUTION INTRAVENOUS at 05:06

## 2024-06-21 RX ADMIN — ONDANSETRON 8 MG: 2 INJECTION INTRAMUSCULAR; INTRAVENOUS at 08:06

## 2024-06-21 RX ADMIN — Medication 25 MG: at 03:06

## 2024-06-21 RX ADMIN — DEXAMETHASONE SODIUM PHOSPHATE 8 MG: 4 INJECTION, SOLUTION INTRA-ARTICULAR; INTRALESIONAL; INTRAMUSCULAR; INTRAVENOUS; SOFT TISSUE at 02:06

## 2024-06-21 RX ADMIN — ROCURONIUM BROMIDE 10 MG: 10 INJECTION, SOLUTION INTRAVENOUS at 02:06

## 2024-06-21 RX ADMIN — FENTANYL CITRATE 50 MCG: 50 INJECTION, SOLUTION INTRAMUSCULAR; INTRAVENOUS at 03:06

## 2024-06-21 RX ADMIN — ROCURONIUM BROMIDE 20 MG: 10 INJECTION, SOLUTION INTRAVENOUS at 04:06

## 2024-06-21 RX ADMIN — GLYCOPYRROLATE 0.4 MG: 0.2 INJECTION, SOLUTION INTRAMUSCULAR; INTRAVENOUS at 06:06

## 2024-06-21 RX ADMIN — HYDROMORPHONE HYDROCHLORIDE 1 MG: 1 INJECTION, SOLUTION INTRAMUSCULAR; INTRAVENOUS; SUBCUTANEOUS at 09:06

## 2024-06-21 RX ADMIN — SODIUM CHLORIDE, SODIUM GLUCONATE, SODIUM ACETATE, POTASSIUM CHLORIDE AND MAGNESIUM CHLORIDE: 526; 502; 368; 37; 30 INJECTION, SOLUTION INTRAVENOUS at 09:06

## 2024-06-21 RX ADMIN — LIDOCAINE HYDROCHLORIDE 2 ML: 20 JELLY TOPICAL at 02:06

## 2024-06-21 RX ADMIN — CEFAZOLIN 2 G: 2 INJECTION, POWDER, FOR SOLUTION INTRAMUSCULAR; INTRAVENOUS at 08:06

## 2024-06-21 NOTE — H&P
Consult         Chief Complaint   Patient presents with    Gastroesophageal Reflux    Hernia    Hiatal Hernia         History of Present Illness:  Patient is a 68 y.o. female who is referred for heartburn, reflux, hiatal hernia, and incisional hernia. I have seen her in the past for chronic cholecystitis.  She reports her symptoms are not controlled while on anti acid medications for at least 3 months.  Reflux worse with laying down.  Some dysphagia that improved with dilation of schatzki ring but heartburn worse.       She also has a larger incisional hernia in her lower abdomen that is stuck out and causing some pain as well.  NO gi symptoms from this.          Review of patient's allergies indicates:   Allergen Reactions    Pyridium [phenazopyridine] Nausea And Vomiting         Current Medications          Current Outpatient Medications   Medication Sig Dispense Refill    gabapentin (NEURONTIN) 300 MG capsule Take 300 mg by mouth every evening.        levocetirizine (XYZAL) 5 MG tablet Take 5 mg by mouth.        meloxicam (MOBIC) 15 MG tablet Take 15 mg by mouth once daily.        metoprolol tartrate (LOPRESSOR) 50 MG tablet If heart rate >60 bpm:  take 50 mg tablet 1 hour prior to CTA.  Bring second tab with you to the procedure. 2 tablet 0    metoprolol tartrate (LOPRESSOR) 50 MG tablet If heart rate >60 bpm:  take 50 mg tablet 1 hour prior to CTA.  Bring second tab with you to the procedure. 2 tablet 0    omeprazole (PRILOSEC) 40 MG capsule Take 1 capsule (40 mg total) by mouth 2 (two) times daily before meals. for 14 days 28 capsule 0      No current facility-administered medications for this visit.                 Past Medical History:   Diagnosis Date    Basal cell carcinoma       face and neck    Gallbladder attack       abdominal pain and nausea    Hiatal hernia with GERD      Sleep apnea              Past Surgical History:   Procedure Laterality Date    BILATERAL TUBAL LIGATION        BLADDER SURGERY      "    lift, rectocele anterocele, , vaginal vault    COLONOSCOPY         2016    ESOPHAGOGASTRODUODENOSCOPY N/A 2/15/2024     Procedure: EGD (ESOPHAGOGASTRODUODENOSCOPY);  Surgeon: Gaby Brown MD;  Location: [facility];  Service: Endoscopy;  Laterality: N/A;    HYSTERECTOMY        LAPAROSCOPIC CHOLECYSTECTOMY N/A 07/10/2023     Procedure: CHOLECYSTECTOMY, LAPAROSCOPIC;  Surgeon: Cherri Gomez MD;  Location: [facility] OR;  Service: General;  Laterality: N/A;    neck and back surgery         C 5-6 fusion; lumbar disc L 4-5    UPPER GASTROINTESTINAL ENDOSCOPY         2019             Family History   Problem Relation Name Age of Onset    Cholelithiasis Mother        Colon cancer Neg Hx          Social History   Social History            Tobacco Use    Smoking status: Never       Passive exposure: Never    Smokeless tobacco: Never   Substance Use Topics    Alcohol use: Never    Drug use: Never               Review of Systems   Gastrointestinal:  Positive for abdominal pain.   Musculoskeletal:  Positive for back pain.   All other systems reviewed and are negative.        Physical:      Vital Signs (Most Recent)  Temp: 97.7 °F (36.5 °C) (05/14/24 0906)  Pulse: 74 (05/14/24 0906)  Resp: 16 (05/14/24 0906)  BP: 134/62 (05/14/24 0906)  5' 1" (1.549 m)  63.5 kg (139 lb 14.1 oz)   Physical Exam  Vitals and nursing note reviewed.   Constitutional:       General: She is not in acute distress.     Appearance: She is well-developed. She is not diaphoretic.   HENT:      Head: Normocephalic and atraumatic.      Mouth/Throat:      Pharynx: No oropharyngeal exudate.   Eyes:      General: No scleral icterus.     Conjunctiva/sclera: Conjunctivae normal.      Pupils: Pupils are equal, round, and reactive to light.   Neck:      Thyroid: No thyromegaly.      Vascular: No JVD.      Trachea: No tracheal deviation.   Cardiovascular:      Rate and Rhythm: Normal rate and regular rhythm.      Heart sounds: Normal heart sounds. No murmur " heard.     No friction rub. No gallop.   Pulmonary:      Effort: Pulmonary effort is normal. No respiratory distress.      Breath sounds: Normal breath sounds. No stridor. No wheezing or rales.   Chest:      Chest wall: No tenderness.   Abdominal:      General: Bowel sounds are normal. There is no distension.      Palpations: Abdomen is soft. There is no mass.      Tenderness: There is no abdominal tenderness. There is no guarding or rebound.        Musculoskeletal:         General: No tenderness. Normal range of motion.      Cervical back: Normal range of motion and neck supple.   Lymphadenopathy:      Cervical: No cervical adenopathy.   Skin:     General: Skin is warm and dry.      Findings: No erythema or rash.   Neurological:      Mental Status: She is alert and oriented to person, place, and time.      Cranial Nerves: No cranial nerve deficit.   Psychiatric:         Behavior: Behavior normal.            ASSESSMENT/PLAN:      Plan  1. Hiatal hernia with GERD          2. Incisional hernia without obstruction or gangrene          3. Gastroesophageal reflux disease, unspecified whether esophagitis present                UGI with moderate hiatal hernia and copious reflux  EGD with schatzki's ring, hiatal hernia, gastric polyps, gastritis-dilation done at this procedure   Recent CT reviewed showing hiatal hernia     Will plan for hiatal hernia repair with partial fundoplication for mod sized hiatal hernia with reflux that persists despite medical management complicated with schatzki ring.     Plan for incisional hernia repair with Dr. Powell likely at the same time. I have called him to discuss.  He will review her chart and get back to me.

## 2024-06-21 NOTE — PLAN OF CARE
Pre-op complete. Sister and  at bedside. Text notifications initiated. Pt denies need for safe. Belongings with .

## 2024-06-21 NOTE — ASSESSMENT & PLAN NOTE
Plans for hiatal hernia repair with partial fundoplication.    Follow recommendations of surgeon.  Needs aggressive incentive spirometry.  Follow hemoglobin and hematocrit closely.  Pain control.  Early ambulation

## 2024-06-21 NOTE — SUBJECTIVE & OBJECTIVE
Past Medical History:   Diagnosis Date    Arthritis     Basal cell carcinoma     face and neck    Encounter for blood transfusion     1980'S    Gallbladder attack     abdominal pain and nausea    Hiatal hernia with GERD     Sleep apnea     NO CPAP       Past Surgical History:   Procedure Laterality Date    BILATERAL TUBAL LIGATION      BLADDER SURGERY      lift, rectocele anterocele, , vaginal vault    COLONOSCOPY      2016    ESOPHAGOGASTRODUODENOSCOPY N/A 02/15/2024    Procedure: EGD (ESOPHAGOGASTRODUODENOSCOPY);  Surgeon: Gaby Brown MD;  Location: [facility] ENDO;  Service: Endoscopy;  Laterality: N/A;    HYSTERECTOMY      LAPAROSCOPIC CHOLECYSTECTOMY N/A 07/10/2023    Procedure: CHOLECYSTECTOMY, LAPAROSCOPIC;  Surgeon: Cherri Gomez MD;  [facility]: [facility] OR;  Service: General;  Laterality: N/A;    neck and back surgery      C 5-6 fusion; lumbar disc L 4-5    SKIN CANCER EXCISION      UPPER GASTROINTESTINAL ENDOSCOPY      2019       Review of patient's allergies indicates:   Allergen Reactions    Pyridium [phenazopyridine] Nausea And Vomiting       No current facility-administered medications on file prior to encounter.     Current Outpatient Medications on File Prior to Encounter   Medication Sig    omeprazole (PRILOSEC) 40 MG capsule Take 1 capsule (40 mg total) by mouth 2 (two) times daily before meals. for 14 days    gabapentin (NEURONTIN) 300 MG capsule Take 300 mg by mouth every evening. (Patient not taking: Reported on 6/20/2024)    levocetirizine (XYZAL) 5 MG tablet Take 5 mg by mouth.    meloxicam (MOBIC) 15 MG tablet Take 15 mg by mouth once daily. (Patient not taking: Reported on 6/20/2024)     Family History       Problem Relation (Age of Onset)    Cancer Mother, Father    Cholelithiasis Mother          Tobacco Use    Smoking status: Never     Passive exposure: Never    Smokeless tobacco: Never   Substance and Sexual Activity    Alcohol use: Yes     Comment: RARELY    Drug use: Never    Sexual  activity: Not Currently     Review of Systems   Constitutional:  Negative for chills and fever.   Respiratory:  Negative for cough and shortness of breath.    Cardiovascular:  Negative for chest pain and palpitations.   Gastrointestinal:  Positive for nausea. Negative for diarrhea and vomiting.   Genitourinary:  Negative for frequency and urgency.   Neurological:  Negative for weakness and headaches.   All other systems reviewed and are negative.    Objective:     Vital Signs (Most Recent):  Temp: 97.7 °F (36.5 °C) (06/21/24 0944)  Pulse: 75 (06/21/24 0944)  Resp: 18 (06/21/24 0944)  BP: (!) 174/78 (06/21/24 0944)  SpO2: 98 % (06/21/24 0944) Vital Signs (24h Range):  Temp:  [97.7 °F (36.5 °C)] 97.7 °F (36.5 °C)  Pulse:  [75] 75  Resp:  [18] 18  SpO2:  [98 %] 98 %  BP: (174)/(78) 174/78     Weight: 62.6 kg (138 lb)  Body mass index is 26.07 kg/m².     Physical Exam  Constitutional:       Appearance: She is well-developed.   HENT:      Head: Normocephalic and atraumatic.   Eyes:      Conjunctiva/sclera: Conjunctivae normal.      Pupils: Pupils are equal, round, and reactive to light.   Cardiovascular:      Rate and Rhythm: Normal rate and regular rhythm.      Heart sounds: Normal heart sounds.   Pulmonary:      Effort: Pulmonary effort is normal. No respiratory distress.      Breath sounds: Normal breath sounds.   Abdominal:      General: Bowel sounds are normal. There is no distension.      Palpations: Abdomen is soft.      Tenderness: There is no abdominal tenderness.   Musculoskeletal:         General: Normal range of motion.      Cervical back: Normal range of motion and neck supple.      Right lower leg: No edema.      Left lower leg: No edema.   Skin:     General: Skin is warm and dry.   Neurological:      General: No focal deficit present.      Mental Status: She is alert and oriented to person, place, and time.   Psychiatric:         Mood and Affect: Mood normal.         Behavior: Behavior normal.          Thought Content: Thought content normal.         Judgment: Judgment normal.              CRANIAL NERVES     CN III, IV, VI   Pupils are equal, round, and reactive to light.       Significant Labs: All pertinent labs within the past 24 hours have been reviewed.  CBC:   Recent Labs   Lab 06/20/24  0948   WBC 5.18   HGB 12.1   HCT 36.6*        CMP:   Recent Labs   Lab 06/20/24  0948      K 4.0      CO2 25   *   BUN 11   CREATININE 0.7   CALCIUM 9.6   ANIONGAP 12       Significant Imaging: I have reviewed all pertinent imaging results/findings within the past 24 hours.

## 2024-06-21 NOTE — ANESTHESIA PREPROCEDURE EVALUATION
06/21/2024  Cornelia Mcdonough is a 68 y.o., female.      Pre-op Assessment    I have reviewed the Patient Summary Reports.     I have reviewed the Nursing Notes. I have reviewed the NPO Status.   I have reviewed the Medications.     Review of Systems  Anesthesia Hx:  No problems with previous Anesthesia                Social:  Non-Smoker       Hematology/Oncology:                        --  Cancer in past history ([facility]):                     Cardiovascular:  Cardiovascular Normal                                            Pulmonary:        Sleep Apnea                Renal/:  Renal/ Normal                 Hepatic/GI:    Hiatal Hernia, GERD, well controlled             Musculoskeletal:  Arthritis               Neurological:  Neurology Normal                                      Endocrine:  Endocrine Normal                Physical Exam  General: Well nourished, Cooperative, Alert and Oriented    Airway:  Mallampati: II   Mouth Opening: Normal  TM Distance: Normal  Neck ROM: Normal ROM    Anesthesia Plan  Type of Anesthesia, risks & benefits discussed:    Anesthesia Type: Gen ETT, Gen Supraglottic Airway, Gen Natural Airway, MAC  Intra-op Monitoring Plan: Standard ASA Monitors  Post Op Pain Control Plan: multimodal analgesia  Induction:  IV  Airway Plan: Direct, Video and Fiberoptic, Post-Induction  Informed Consent: Informed consent signed with the Patient and all parties understand the risks and agree with anesthesia plan.  All questions answered.   ASA Score: 2    Ready For Surgery From Anesthesia Perspective.   .

## 2024-06-21 NOTE — PLAN OF CARE
Unable to obtain discharge assessment as pt still in surgery.       06/21/24 0104   Discharge Assessment   Assessment Type Discharge Planning Assessment

## 2024-06-21 NOTE — OP NOTE
-Robotic paraesophageal hiatal hernia with fundoplication  -incisional hernia repair (see Dr Powell's note)    Procedure Note    Date of procedure:   06/21/2024    Indications: 69 y/o with  hiatal hernia and incisional hernia, here for repair    Pre-operative Diagnosis: para esophageal hiatal hernia, incisional hernia    Post-operative Diagnosis: Same    Surgeon: Cherri Gomez MD    Assistants: Belinda Roth CFA    Anesthesia: General endotracheal anesthesia    ASA Class: 3    Procedure Details   The patient was seen in the Holding Room. The risks, benefits, complications, treatment options, and expected outcomes were discussed with the patient. The possibilities of reaction to medication, pulmonary aspiration, perforation of viscus, bleeding, recurrent infection, the need for additional procedures, failure to diagnose a condition, and creating a complication requiring transfusion or operation were discussed with the patient. The patient concurred with the proposed plan, giving informed consent. The site of surgery properly noted/marked. The patient was taken to Operating Room 5 identified as Cornelia Mcdonough and the procedure verified as robotic para esophageal hiatal hernia repair; consent for incisional hernia obtained by Dr. Powell. A Time Out was held and the above information confirmed.    Full general anesthesia was induced with orotracheal intubation. The patient was prepped and draped in a supine position. Appropriate antibiotics were given intravenously. Arms were out.    An incision was made around the umbilicus and a small hernia defect was visualized.  A trocar was inserted and CO2 insufflation was started.  This allowed direct access into the peritoneal cavity.  Adhesions were found in or abdomen however there were none in the upper abdomen.  Other trocars were then placed under direct visualization.      Liver retractor was placed through high epigastric incision and the patient was  appropriately positioned.  The robot was then docked and the procedure was started.  I immediately could note a moderate to large paraesophageal hiatal hernia.  I started by taking down the short gastrics to the fundus up to the level of the hernia sac.  Hernia sac was then circumferentially dissected from the esophageal hiatus using vessel sealer.  Once the hernia sac was completely freed from the esophageal hiatus it easily reduced into the peritoneal cavity and was transected from its surrounding attachments to the mediastinal contents.  A dissection was undertaken into the mediastinum  the esophagus from the pericardium, pleura, aorta, spine, esophageal hiatus.  During the dissection the vagus nerves were identified and spared.  At least 2 cm of intra-abdominal esophagus was obtained.  This was on no tension.  The esophageal hiatus was then closed posterior to the esophagus using a running nonabsorbable V lock suture.  The fundus was then wrapped posterior to the esophagus and sutured to the esophagus in 6 different locations to create a 270 degree wrap.  This was done over a 40 Thai bougie and then leak tested.  No leak was seen.  Once this was complete hemostasis was achieved liver retractor was removed and the incisional hernia was addressed with Dr. Powell.    Instrument, sponge, and needle counts were correct prior to wound closure and at the conclusion of the case.     Findings:  paraesophageal hiatal hernia, incisional hernia    Estimated Blood Loss: 50.0 cc    Drains: none    Total IV Fluids: see anesthesia    Specimens: hernia sac    Implants: none    Complications:  None; patient tolerated the procedure well.    Disposition: PACU - hemodynamically stable.    Condition: stable    Attending Attestation: I was present and scrubbed for the entire procedure.

## 2024-06-21 NOTE — TRANSFER OF CARE
"Anesthesia Transfer of Care Note    Patient: Cronelia Mcdonough    Procedure(s) Performed: Procedure(s) (LRB):  XI ROBOTIC REPAIR, HERNIA, HIATAL (N/A)  ROBOTIC REPAIR, HERNIA, INCISIONAL (N/A)    Patient location: PACU    Anesthesia Type: general    Transport from OR: Transported from OR on 2-3 L/min O2 by NC with adequate spontaneous ventilation    Post pain: adequate analgesia    Post assessment: no apparent anesthetic complications    Post vital signs: stable    Level of consciousness: responds to stimulation    Nausea/Vomiting: no nausea/vomiting    Complications: none    Transfer of care protocol was followed      Last vitals: Visit Vitals  BP (!) 174/78 (BP Location: Right arm, Patient Position: Lying)   Pulse 75   Temp 36.5 °C (97.7 °F) (Skin)   Resp 18   Ht 5' 1" (1.549 m)   Wt 62.6 kg (138 lb)   SpO2 98%   Breastfeeding No   BMI 26.07 kg/m²     "

## 2024-06-21 NOTE — H&P
[facility] Medicine  History & Physical    Patient Name: Cornelia Mcdonough  MRN: [id number]  Patient Class: IP- Surgery Admit  Admission Date: 6/21/2024  Attending Physician: Cherri Gomez MD   Primary Care Provider: Navid Willingham MD         Patient information was obtained from patient and past medical records.     Subjective:     Principal Problem:Hiatal hernia with GERD    Chief Complaint:   Chief Complaint   Patient presents with    Procedure        HPI: Cornelia Mcdonough is a 67 y/o F with a past medical history significant for basal cell carcinoma and GERD who presents today for scheduled hiatal hernia repair with Dr. Gomez.  Pt also has and incisional hernia which has caused her some discomfort which will be repaired today by Dr. Zambrano.  Preoperatively, pt was referred to Dr. Gomez due to symptoms of heartburn and reflux associated with hiatal hernia refractory to conservative measures.  Plan today for hiatal hernia repair with partial fundoplication.  She is evaluated preoperatively in the surgery holding area.  Plan of care discussed with patient and her family.  She will be monitored postoperatively by the service of hospital medicine.     Past Medical History:   Diagnosis Date    Arthritis     Basal cell carcinoma     face and neck    Encounter for blood transfusion     1980'S    Gallbladder attack     abdominal pain and nausea    Hiatal hernia with GERD     Sleep apnea     NO CPAP       Past Surgical History:   Procedure Laterality Date    BILATERAL TUBAL LIGATION      BLADDER SURGERY      lift, rectocele anterocele, , vaginal vault    COLONOSCOPY      2016    ESOPHAGOGASTRODUODENOSCOPY N/A 02/15/2024    Procedure: EGD (ESOPHAGOGASTRODUODENOSCOPY);  Surgeon: Gaby Brown MD;  Location: [facility];  Service: Endoscopy;  Laterality: N/A;    HYSTERECTOMY      LAPAROSCOPIC CHOLECYSTECTOMY N/A 07/10/2023    Procedure: CHOLECYSTECTOMY,  LAPAROSCOPIC;  Surgeon: Cherri Gomez MD;  Location: [facility];  Service: General;  Laterality: N/A;    neck and back surgery      C 5-6 fusion; lumbar disc L 4-5    SKIN CANCER EXCISION      UPPER GASTROINTESTINAL ENDOSCOPY      2019       Review of patient's allergies indicates:   Allergen Reactions    Pyridium [phenazopyridine] Nausea And Vomiting       No current facility-administered medications on file prior to encounter.     Current Outpatient Medications on File Prior to Encounter   Medication Sig    omeprazole (PRILOSEC) 40 MG capsule Take 1 capsule (40 mg total) by mouth 2 (two) times daily before meals. for 14 days    gabapentin (NEURONTIN) 300 MG capsule Take 300 mg by mouth every evening. (Patient not taking: Reported on 6/20/2024)    levocetirizine (XYZAL) 5 MG tablet Take 5 mg by mouth.    meloxicam (MOBIC) 15 MG tablet Take 15 mg by mouth once daily. (Patient not taking: Reported on 6/20/2024)     Family History       Problem Relation (Age of Onset)    Cancer Mother, Father    Cholelithiasis Mother          Tobacco Use    Smoking status: Never     Passive exposure: Never    Smokeless tobacco: Never   Substance and Sexual Activity    Alcohol use: Yes     Comment: RARELY    Drug use: Never    Sexual activity: Not Currently     Review of Systems   Constitutional:  Negative for chills and fever.   Respiratory:  Negative for cough and shortness of breath.    Cardiovascular:  Negative for chest pain and palpitations.   Gastrointestinal:  Positive for nausea. Negative for diarrhea and vomiting.   Genitourinary:  Negative for frequency and urgency.   Neurological:  Negative for weakness and headaches.   All other systems reviewed and are negative.    Objective:     Vital Signs (Most Recent):  Temp: 97.7 °F (36.5 °C) (06/21/24 0944)  Pulse: 75 (06/21/24 0944)  Resp: 18 (06/21/24 0944)  BP: (!) 174/78 (06/21/24 0944)  SpO2: 98 % (06/21/24 0944) Vital Signs (24h Range):  Temp:  [97.7 °F (36.5 °C)] 97.7 °F  (36.5 °C)  Pulse:  [75] 75  Resp:  [18] 18  SpO2:  [98 %] 98 %  BP: (174)/(78) 174/78     Weight: 62.6 kg (138 lb)  Body mass index is 26.07 kg/m².     Physical Exam  Constitutional:       Appearance: She is well-developed.   HENT:      Head: Normocephalic and atraumatic.   Eyes:      Conjunctiva/sclera: Conjunctivae normal.      Pupils: Pupils are equal, round, and reactive to light.   Cardiovascular:      Rate and Rhythm: Normal rate and regular rhythm.      Heart sounds: Normal heart sounds.   Pulmonary:      Effort: Pulmonary effort is normal. No respiratory distress.      Breath sounds: Normal breath sounds.   Abdominal:      General: Bowel sounds are normal. There is no distension.      Palpations: Abdomen is soft.      Tenderness: There is no abdominal tenderness.   Musculoskeletal:         General: Normal range of motion.      Cervical back: Normal range of motion and neck supple.      Right lower leg: No edema.      Left lower leg: No edema.   Skin:     General: Skin is warm and dry.   Neurological:      General: No focal deficit present.      Mental Status: She is alert and oriented to person, place, and time.   Psychiatric:         Mood and Affect: Mood normal.         Behavior: Behavior normal.         Thought Content: Thought content normal.         Judgment: Judgment normal.              CRANIAL NERVES     CN III, IV, VI   Pupils are equal, round, and reactive to light.       Significant Labs: All pertinent labs within the past 24 hours have been reviewed.  CBC:   Recent Labs   Lab 06/20/24  0948   WBC 5.18   HGB 12.1   HCT 36.6*        CMP:   Recent Labs   Lab 06/20/24  0948      K 4.0      CO2 25   *   BUN 11   CREATININE 0.7   CALCIUM 9.6   ANIONGAP 12       Significant Imaging: I have reviewed all pertinent imaging results/findings within the past 24 hours.  Assessment/Plan:     * Hiatal hernia with GERD  Plans for hiatal hernia repair with partial fundoplication.     Follow recommendations of surgeon.  Needs aggressive incentive spirometry.  Follow hemoglobin and hematocrit closely.  Pain control.  Early ambulation        Incisional hernia without obstruction or gangrene  Plans for repair today with Dr. Zambrano.  See plan above.        VTE Risk Mitigation (From admission, onward)           Ordered     IP VTE LOW RISK PATIENT  Once         06/21/24 1006     Place EDUARD hose  Until discontinued         06/21/24 1006     Place sequential compression device  Until discontinued         06/21/24 1006                          JAY JAY Gonzalez  Department of Hospital Medicine  [facility]

## 2024-06-21 NOTE — HPI
Cornelia Mcdonough is a 67 y/o F with a past medical history significant for basal cell carcinoma and GERD who presents today for scheduled hiatal hernia repair with Dr. Gomez.  Pt also has and incisional hernia which has caused her some discomfort which will be repaired today by Dr. Zambrano.  Preoperatively, pt was referred to Dr. Gomez due to symptoms of heartburn and reflux associated with hiatal hernia refractory to conservative measures.  Plan today for hiatal hernia repair with partial fundoplication.  She is evaluated preoperatively in the surgery holding area.  Plan of care discussed with patient and her family.  She will be monitored postoperatively by the service of hospital medicine.

## 2024-06-21 NOTE — ANESTHESIA PROCEDURE NOTES
Intubation    Date/Time: 6/21/2024 2:12 PM    Performed by: Jhonny Beckman Jr., CRNA  Authorized by: Dano Jarrett MD    Intubation:     Induction:  Intravenous    Intubated:  Postinduction    Mask Ventilation:  Easy mask    Attempts:  1    Attempted By:  CRNA    Method of Intubation:  Video laryngoscopy    Blade:  Khanna 3    Laryngeal View Grade: Grade I - full view of cords      Difficult Airway Encountered?: No      Complications:  None    Airway Device:  Oral endotracheal tube    Airway Device Size:  7.5    Style/Cuff Inflation:  Cuffed (inflated to minimal occlusive pressure)    Inflation Amount (mL):  4    Tube secured:  20    Secured at:  The lips    Placement Verified By:  Capnometry    Complicating Factors:  Anterior larynx    Findings Post-Intubation:  BS equal bilateral and atraumatic/condition of teeth unchanged

## 2024-06-22 VITALS
BODY MASS INDEX: 27.39 KG/M2 | DIASTOLIC BLOOD PRESSURE: 67 MMHG | RESPIRATION RATE: 18 BRPM | WEIGHT: 145.06 LBS | HEIGHT: 61 IN | HEART RATE: 79 BPM | SYSTOLIC BLOOD PRESSURE: 152 MMHG | TEMPERATURE: 99 F | OXYGEN SATURATION: 96 %

## 2024-06-22 LAB
ANION GAP SERPL CALC-SCNC: 11 MMOL/L (ref 8–16)
BASOPHILS # BLD AUTO: 0.01 K/UL (ref 0–0.2)
BASOPHILS NFR BLD: 0.1 % (ref 0–1.9)
BUN SERPL-MCNC: 7 MG/DL (ref 8–23)
CALCIUM SERPL-MCNC: 8.8 MG/DL (ref 8.7–10.5)
CHLORIDE SERPL-SCNC: 105 MMOL/L (ref 95–110)
CO2 SERPL-SCNC: 25 MMOL/L (ref 23–29)
CREAT SERPL-MCNC: 0.8 MG/DL (ref 0.5–1.4)
DIFFERENTIAL METHOD BLD: ABNORMAL
EOSINOPHIL # BLD AUTO: 0 K/UL (ref 0–0.5)
EOSINOPHIL NFR BLD: 0 % (ref 0–8)
ERYTHROCYTE [DISTWIDTH] IN BLOOD BY AUTOMATED COUNT: 12.1 % (ref 11.5–14.5)
EST. GFR  (NO RACE VARIABLE): >60 ML/MIN/1.73 M^2
GLUCOSE SERPL-MCNC: 148 MG/DL (ref 70–110)
HCT VFR BLD AUTO: 34.5 % (ref 37–48.5)
HGB BLD-MCNC: 11.6 G/DL (ref 12–16)
IMM GRANULOCYTES # BLD AUTO: 0.02 K/UL (ref 0–0.04)
IMM GRANULOCYTES NFR BLD AUTO: 0.2 % (ref 0–0.5)
LYMPHOCYTES # BLD AUTO: 0.8 K/UL (ref 1–4.8)
LYMPHOCYTES NFR BLD: 7.7 % (ref 18–48)
MAGNESIUM SERPL-MCNC: 1.7 MG/DL (ref 1.6–2.6)
MCH RBC QN AUTO: 31.7 PG (ref 27–31)
MCHC RBC AUTO-ENTMCNC: 33.6 G/DL (ref 32–36)
MCV RBC AUTO: 94 FL (ref 82–98)
MONOCYTES # BLD AUTO: 0.4 K/UL (ref 0.3–1)
MONOCYTES NFR BLD: 3.9 % (ref 4–15)
NEUTROPHILS # BLD AUTO: 8.9 K/UL (ref 1.8–7.7)
NEUTROPHILS NFR BLD: 88.1 % (ref 38–73)
NRBC BLD-RTO: 0 /100 WBC
PHOSPHATE SERPL-MCNC: 3.5 MG/DL (ref 2.7–4.5)
PLATELET # BLD AUTO: 319 K/UL (ref 150–450)
PMV BLD AUTO: 9.6 FL (ref 9.2–12.9)
POTASSIUM SERPL-SCNC: 4 MMOL/L (ref 3.5–5.1)
RBC # BLD AUTO: 3.66 M/UL (ref 4–5.4)
SODIUM SERPL-SCNC: 141 MMOL/L (ref 136–145)
WBC # BLD AUTO: 10.12 K/UL (ref 3.9–12.7)

## 2024-06-22 PROCEDURE — 94760 N-INVAS EAR/PLS OXIMETRY 1: CPT

## 2024-06-22 PROCEDURE — 25000003 PHARM REV CODE 250: Performed by: SURGERY

## 2024-06-22 PROCEDURE — 94761 N-INVAS EAR/PLS OXIMETRY MLT: CPT

## 2024-06-22 PROCEDURE — 94799 UNLISTED PULMONARY SVC/PX: CPT | Mod: XB

## 2024-06-22 PROCEDURE — 83735 ASSAY OF MAGNESIUM: CPT | Performed by: SURGERY

## 2024-06-22 PROCEDURE — 36415 COLL VENOUS BLD VENIPUNCTURE: CPT | Performed by: SURGERY

## 2024-06-22 PROCEDURE — 80048 BASIC METABOLIC PNL TOTAL CA: CPT | Performed by: SURGERY

## 2024-06-22 PROCEDURE — 85025 COMPLETE CBC W/AUTO DIFF WBC: CPT | Performed by: SURGERY

## 2024-06-22 PROCEDURE — 84100 ASSAY OF PHOSPHORUS: CPT | Performed by: SURGERY

## 2024-06-22 PROCEDURE — 63600175 PHARM REV CODE 636 W HCPCS: Performed by: SURGERY

## 2024-06-22 RX ADMIN — PANTOPRAZOLE SODIUM 40 MG: 40 TABLET, DELAYED RELEASE ORAL at 09:06

## 2024-06-22 RX ADMIN — CEFAZOLIN 2 G: 2 INJECTION, POWDER, FOR SOLUTION INTRAMUSCULAR; INTRAVENOUS at 09:06

## 2024-06-22 RX ADMIN — CEFAZOLIN 2 G: 2 INJECTION, POWDER, FOR SOLUTION INTRAMUSCULAR; INTRAVENOUS at 02:06

## 2024-06-22 NOTE — NURSING
Nurses Note -- 4 Eyes      6/21/2024   10:41 PM      Skin assessed during: Admit      [x] No Altered Skin Integrity Present    []Prevention Measures Documented      [] Yes- Altered Skin Integrity Present or Discovered   [] LDA Added if Not in Epic (Describe Wound)   [] New Altered Skin Integrity was Present on Admit and Documented in LDA   [] Wound Image Taken    Wound Care Consulted? No    Attending Nurse:   MARYANN Garcia    Second RN/Staff Member:  MARYANN Mistry

## 2024-06-22 NOTE — BRIEF OP NOTE
[facility] Services  Brief Operative Note    SUMMARY     Surgery Date: 6/21/2024     Surgeons and Role:  Panel 1:     * Cherri Gomez MD - Primary  Panel 2:     * Cristian Zambrano III, MD - Primary    Assist Belinda Roth     Pre-op Diagnosis:  Hiatal hernia with GERD [K44.9, K21.9]  Incisional hernia without obstruction or gangrene [K43.2]    Post-op Diagnosis:  Post-Op Diagnosis Codes:     * Hiatal hernia with GERD [K44.9, K21.9]     * Incisional hernia without obstruction or gangrene [K43.2]    Procedure(s) (LRB):  XI ROBOTIC REPAIR, HERNIA, HIATAL (N/A)  ROBOTIC REPAIR, HERNIA, INCISIONAL (N/A) - 5 cm width. Repaired with fascial closure and 12 cm mesh placement.     Anesthesia: General    Implants:  Implant Name Type Inv. Item Serial No.  Lot No. LRB No. Used Action   MESH PARIETENE COMP RND 12CM - [id number]  MESH PARIETENE COMP RND 12CM  COVIDIEN [id number] N/A 1 Implanted       Operative Findings: I entered the case after Dr. Gomez completed the hiatal hernia repair. The robot was undocked. I placed the camera back into the port to view the lower abdomen and hernia.  It was very inferior. The lower edge was just a few cms above the pubic bone. It was incarcerated with omentum. Width was 5 cm. The robotic ports were in good position as they were. Id did not need to place anything new.  The robot was docked to the ports.  Instruments were placed into the abdomen.  I then scrubbed out and went to the console.  The hernia defect was closed with a running number 1 permanent barbed suture.  A 12 cm circular mesh was chosen for repair. The mesh was placed into the abdomen.  The mesh was secured to the abdominal wall using running V lock barbed suture.  Several V lock sutures were used in a running fashion to secure the mesh edges to the abdominal wall.  We had good overlap of mesh edges to the hernia defect.  The mesh was sutured securely and flat the abdominal wall. The  ports were removed.  The robot was undocked.  Abdomen was deflated. The 12 mm periumbilical port site was closed with interrupted ethibond.  Skin sites were sutured with a running 4-0 Monocryl.  Incisions were bandaged.  She was extubated and brought to recovery room in stable condition.     Estimated Blood Loss: 20 mL  Complications none   Instrument counts correct   Estimated Blood Loss has been documented.         Specimens:   Specimen (24h ago, onward)       Start     Ordered    06/21/24 1620  Specimen to Pathology - Surgery  Once        Comments: Pre-op Diagnosis: Hiatal hernia with GERD [K44.9, K21.9]Incisional hernia without obstruction or gangrene [K43.2]Procedure(s):XI ROBOTIC REPAIR, HERNIA, HIATALROBOTIC REPAIR, HERNIA, INCISIONAL Number of specimens: 1Name of specimens: HIATAL HERNIA SAC     Question:  Release to patient  Answer:  Immediate    06/21/24 0528                    [id number]

## 2024-06-22 NOTE — CARE UPDATE
06/21/24 2015   Patient Assessment/Suction   Level of Consciousness (AVPU) alert   Respiratory Effort Normal;Unlabored   Expansion/Accessory Muscles/Retractions expansion symmetric;no retractions;no use of accessory muscles   PRE-TX-O2   Device (Oxygen Therapy) nasal cannula   $ Is the patient on Low Flow Oxygen? Yes   Flow (L/min) (Oxygen Therapy) 2   Oxygen Concentration (%) 28   SpO2 96 %  (Simultaneous filing. User may not have seen previous data.)   Pulse Oximetry Type Intermittent   $ Pulse Oximetry - Single Charge Pulse Oximetry - Single   Pulse 79  (Simultaneous filing. User may not have seen previous data.)   Resp 18  (Simultaneous filing. User may not have seen previous data.)   Temp 98 °F (36.7 °C)   BP (!) 165/79   Incentive Spirometer   $ Incentive Spirometer Charges done with encouragement   Administration (IS) proper technique demonstrated   Number of Repetitions (IS) 10   Level Incentive Spirometer (mL) 1000   Patient Tolerance (IS) good   Ready to Wean/Extubation Screen   FIO2<=50 (chart decimal) 0.28

## 2024-06-22 NOTE — OP NOTE
Surgery Date: 6/21/2024     Surgeons and Role:  Panel 1:     * Cherri Gomez MD - Primary  Panel 2:     * Cristian Zambrano III, MD - Primary    Assist Belinda Roth     Pre-op Diagnosis:  Hiatal hernia with GERD [K44.9, K21.9]  Incisional hernia without obstruction or gangrene [K43.2]    Post-op Diagnosis:  Post-Op Diagnosis Codes:     * Hiatal hernia with GERD [K44.9, K21.9]     * Incisional hernia without obstruction or gangrene [K43.2]    Procedure(s) (LRB):  XI ROBOTIC REPAIR, HERNIA, HIATAL (N/A)  ROBOTIC REPAIR, HERNIA, INCISIONAL (N/A) - 5 cm width. Repaired with fascial closure and 12 cm mesh placement.     Anesthesia: General    Implants:  Implant Name Type Inv. Item Serial No.  Lot No. LRB No. Used Action   MESH PARIETENE COMP RND 12CM - [id number]  MESH PARIETENE COMP RND 12CM  COVIDIEN [id number] N/A 1 Implanted       Operative Findings: I entered the case after Dr. Gomez completed the hiatal hernia repair. The robot was undocked. I placed the camera back into the port to view the lower abdomen and hernia.  It was very inferior. The lower edge was just a few cms above the pubic bone. It was incarcerated with omentum. Width was 5 cm. The robotic ports were in good position as they were. Id did not need to place anything new.  The robot was docked to the ports.  Instruments were placed into the abdomen.  I then scrubbed out and went to the console.  The hernia defect was closed with a running number 1 permanent barbed suture.  A 12 cm circular mesh was chosen for repair. The mesh was placed into the abdomen.  The mesh was secured to the abdominal wall using running V lock barbed suture.  Several V lock sutures were used in a running fashion to secure the mesh edges to the abdominal wall.  We had good overlap of mesh edges to the hernia defect.  The mesh was sutured securely and flat the abdominal wall. The ports were removed.  The robot was undocked.  Abdomen was deflated. The 12 mm  periumbilical port site was closed with interrupted ethibond.  Skin sites were sutured with a running 4-0 Monocryl.  Incisions were bandaged.  She was extubated and brought to recovery room in stable condition.     Estimated Blood Loss: 20 mL  Complications none   Instrument counts correct   Estimated Blood Loss has been documented.         Specimens:   Specimen (24h ago, onward)       Start     Ordered    06/21/24 1620  Specimen to Pathology - Surgery  Once        Comments: Pre-op Diagnosis: Hiatal hernia with GERD [K44.9, K21.9]Incisional hernia without obstruction or gangrene [K43.2]Procedure(s):XI ROBOTIC REPAIR, HERNIA, HIATALROBOTIC REPAIR, HERNIA, INCISIONAL Number of specimens: 1Name of specimens: HIATAL HERNIA SAC     Question:  Release to patient  Answer:  Immediate    06/21/24 3920                    [id number]

## 2024-06-22 NOTE — PLAN OF CARE
Patient cleared for discharge from case management standpoint.    Post op follow up scheduled and placed on AVS.       06/22/24 8209   Final Note   Assessment Type Final Discharge Note   Anticipated Discharge Disposition Home   Hospital Resources/Appts/Education Provided Provided patient/caregiver with written discharge plan information;Provided education on problems/symptoms using teachback;Appointments scheduled and added to AVS   Post-Acute Status   Post-Acute Authorization Other   Other Status No Post-Acute Service Needs   Discharge Delays None known at this time

## 2024-06-22 NOTE — NURSING
Pt given explained discharge instructions, all questions asked and answered, pt has no complaints, laps sites intact with derma bond, no c/o pain, no s/s of infection noted, pt decline wheelchair, pt ambulated off unit to POV with  @side

## 2024-06-22 NOTE — PROGRESS NOTES
POD 1 s/p robotic hiatal /incisional hernia  Pt doing well. No pain or discomfort  NO n/v  Tolerating liquid diet  Wants to go home    Wt Readings from Last 3 Encounters:   06/21/24 65.8 kg (145 lb 1 oz)   05/30/24 62.6 kg (138 lb)   05/14/24 63.5 kg (139 lb 14.1 oz)     Temp Readings from Last 3 Encounters:   06/22/24 98.5 °F (36.9 °C)   05/30/24 97.8 °F (36.6 °C)   05/14/24 97.7 °F (36.5 °C) (Oral)     BP Readings from Last 3 Encounters:   06/22/24 (!) 152/67   05/30/24 (!) 166/81   05/14/24 134/62     Pulse Readings from Last 3 Encounters:   06/22/24 79   05/30/24 75   05/14/24 74     AAox3  Soft/nd/nt  No resp distresss    Lab Results   Component Value Date    WBC 10.12 06/22/2024    HGB 11.6 (L) 06/22/2024    HCT 34.5 (L) 06/22/2024    MCV 94 06/22/2024     06/22/2024       BMP  Lab Results   Component Value Date     06/22/2024    K 4.0 06/22/2024     06/22/2024    CO2 25 06/22/2024    BUN 7 (L) 06/22/2024    CREATININE 0.8 06/22/2024    CALCIUM 8.8 06/22/2024    ANIONGAP 11 06/22/2024    EGFRNORACEVR >60 06/22/2024     A/P: s/p robotic HH repair/incisional hernia repair  Doing well  Ok to d/c home from surgical perspective

## 2024-06-22 NOTE — PLAN OF CARE
Problem: Hernia Repair  Goal: Absence of Infection Signs and Symptoms  Outcome: Progressing  Goal: Optimal Pain Control and Function  Outcome: Progressing  Goal: Effective Urinary Elimination  Outcome: Progressing

## 2024-06-22 NOTE — PLAN OF CARE
Problem: Adult Inpatient Plan of Care  Goal: Plan of Care Review  Outcome: Progressing  Goal: Patient-Specific Goal (Individualized)  Outcome: Progressing  Goal: Absence of Hospital-Acquired Illness or Injury  Outcome: Progressing  Goal: Optimal Comfort and Wellbeing  Outcome: Progressing  Goal: Readiness for Transition of Care  Outcome: Progressing     Problem: Infection  Goal: Absence of Infection Signs and Symptoms  Outcome: Progressing     Problem: Wound  Goal: Optimal Coping  Outcome: Progressing  Goal: Optimal Functional Ability  Outcome: Progressing  Goal: Absence of Infection Signs and Symptoms  Outcome: Progressing  Goal: Improved Oral Intake  Outcome: Progressing  Goal: Optimal Pain Control and Function  Outcome: Progressing  Goal: Skin Health and Integrity  Outcome: Progressing  Goal: Optimal Wound Healing  Outcome: Progressing     Problem: Hernia Repair  Goal: Absence of Bleeding  Outcome: Progressing  Goal: Effective Bowel Elimination  Outcome: Progressing  Goal: Fluid and Electrolyte Balance  Outcome: Progressing  Goal: Absence of Infection Signs and Symptoms  Outcome: Progressing  Goal: Anesthesia/Sedation Recovery  Outcome: Progressing  Goal: Optimal Pain Control and Function  Outcome: Progressing  Goal: Nausea and Vomiting Relief  Outcome: Progressing  Goal: Effective Urinary Elimination  Outcome: Progressing  Goal: Effective Oxygenation and Ventilation  Outcome: Progressing     Problem: Fall Injury Risk  Goal: Absence of Fall and Fall-Related Injury  Outcome: Progressing

## 2024-06-22 NOTE — NURSING
"Pt arrived to room in bed with spouse at bedside. Pt is drowsy, awake, oriented x 4. Respirations even and unlabored on 2L 02 per n/c with no s/s distress noted at this time. 18 g PIV to LFA, LR infusion started at 125 mL/hr per order. EDUARD hose in place, SCD"s placed. Cardiac monitoring in place. Ice chips provided. Abd lap sites x 6 present and dermabonded c/d/I with no redness, swelling or drainage noted. Pure wick place to suction. Plan of care discussed with pt and spouse and all questions answered. Admission completed. Call light and personal belongings within reach. No further needs voiced at this time.   "

## 2024-06-23 NOTE — HOSPITAL COURSE
Cornelia Mcdonough was admitted following hiatal hernia repair with Dr. Gomez and incisional hernia repair with Dr. Zambrano.  She was monitored closely postoperatively.  Pain was controlled with IV and oral narcotics.  She tolerated clear liquid diet which was advanced to full and well tolerated.  She ambulated around the hallways without dizziness or lightheadedness.  She remained hemodynamically stable.  She was seen and examined by general surgery as well as hospital medicine and was appropriate for discharge.  Discharge instructions were reviewed and return precautions discussed.  Appropriate follow-up was arranged.  She was discharged home in good condition.

## 2024-06-23 NOTE — ANESTHESIA POSTPROCEDURE EVALUATION
Anesthesia Post Evaluation    Patient: Cornelia Mcdonough    Procedure(s) Performed: Procedure(s) (LRB):  XI ROBOTIC REPAIR, HERNIA, HIATAL (N/A)  ROBOTIC REPAIR, HERNIA, INCISIONAL (N/A)    Final Anesthesia Type: general      Patient location during evaluation: PACU  Patient participation: Yes- Able to Participate  Level of consciousness: awake and alert and oriented  Post-procedure vital signs: reviewed and stable  Pain management: adequate  Airway patency: patent    PONV status at discharge: No PONV  Anesthetic complications: no      Cardiovascular status: blood pressure returned to baseline and stable  Respiratory status: unassisted and spontaneous ventilation  Hydration status: euvolemic  Follow-up not needed.              Vitals Value Taken Time   /67 06/22/24 1239   Temp 36.9 °C (98.5 °F) 06/22/24 1239   Pulse 79 06/22/24 1331   Resp 18 06/22/24 1331   SpO2 96 % 06/22/24 1331         Event Time   Out of Recovery 19:30:00         Pain/Garrett Score: No data recorded

## 2024-06-23 NOTE — DISCHARGE SUMMARY
[facility] Hospital Medicine  Discharge Summary      Patient Name: Cornelia Mcdonough  MRN: [id number]  ROMAN: [id number]  Patient Class: IP- Inpatient  Admission Date: 6/21/2024  Hospital Length of Stay: 1 days  Discharge Date and Time: 6/22/2024  3:50 PM  Attending Physician: Belle att. providers found   Discharging Provider: JAY JAY Gonzalez  Primary Care Provider: Navid Willingham MD    Primary Care Team: Networked reference to record PCT     HPI:   Cornelia Mcdonough is a 67 y/o F with a past medical history significant for basal cell carcinoma and GERD who presents today for scheduled hiatal hernia repair with Dr. Gomez.  Pt also has and incisional hernia which has caused her some discomfort which will be repaired today by Dr. Zambrano.  Preoperatively, pt was referred to Dr. Gomez due to symptoms of heartburn and reflux associated with hiatal hernia refractory to conservative measures.  Plan today for hiatal hernia repair with partial fundoplication.  She is evaluated preoperatively in the surgery holding area.  Plan of care discussed with patient and her family.  She will be monitored postoperatively by the service of hospital medicine.     Procedure(s) (LRB):  XI ROBOTIC REPAIR, HERNIA, HIATAL (N/A)  ROBOTIC REPAIR, HERNIA, INCISIONAL (N/A)      Hospital Course:   Cornelia Mcdonough was admitted following hiatal hernia repair with Dr. Gomez and incisional hernia repair with Dr. Zambrano.  She was monitored closely postoperatively.  Pain was controlled with IV and oral narcotics.  She tolerated clear liquid diet which was advanced to full and well tolerated.  She ambulated around the hallways without dizziness or lightheadedness.  She remained hemodynamically stable.  She was seen and examined by general surgery as well as hospital medicine and was appropriate for discharge.  Discharge instructions were reviewed and return precautions discussed.  Appropriate follow-up was  arranged.  She was discharged home in good condition.     Goals of Care Treatment Preferences:  Code Status: Full Code      Consults:     No new Assessment & Plan notes have been filed under this hospital service since the last note was generated.  Service: Hospital Medicine    Final Active Diagnoses:    Diagnosis Date Noted POA    PRINCIPAL PROBLEM:  Hiatal hernia with GERD [K44.9, K21.9] 10/11/2023 Yes    Incisional hernia without obstruction or gangrene [K43.2] 10/11/2023 Yes      Problems Resolved During this Admission:       Discharged Condition: good    Disposition: Home or Self Care    Follow Up:   Follow-up Information       Cherri Gomez MD. Go on 7/9/2024.    Specialties: General Surgery, Bariatrics, Surgery  Why: at 9:00am for post op follow up  Contact information:  [address]  [address]  [place] [address]  [phone]               Navid Willingham MD. Schedule an appointment as soon as possible for a visit in 1 week(s).    Specialty: Family Medicine  Contact information:  [address]  [facility] [address]  [phone]               Cristian Zambrano III, MD. Go on 7/2/2024.    Specialties: General Surgery, Surgery  Why: at 10:00am for post op follow up  Contact information:  [address]  [address]  [place] [address]  [phone]                           Patient Instructions:      Diet Dysphagia Soft     Lifting restrictions     Notify your health care provider if you experience any of the following:  temperature >100.4     Notify your health care provider if you experience any of the following:  persistent nausea and vomiting or diarrhea     Notify your health care provider if you experience any of the following:  severe uncontrolled pain     Notify your health care provider if you experience any of the following:  redness, tenderness, or signs of infection (pain, swelling, redness, odor or green/yellow discharge around incision site)     Notify your health care provider if you  experience any of the following:  difficulty breathing or increased cough     Notify your health care provider if you experience any of the following:  severe persistent headache     Notify your health care provider if you experience any of the following:  persistent dizziness, light-headedness, or visual disturbances     Notify your health care provider if you experience any of the following:  increased confusion or weakness       Significant Diagnostic Studies: Labs: All labs within the past 24 hours have been reviewed    Pending Diagnostic Studies:       Procedure Component Value Units Date/Time    Specimen to Pathology - Surgery [[id number]] Collected: 06/21/24 1646    Order Status: Sent Lab Status: No result     Specimen: Tissue            Medications:  Reconciled Home Medications:      Medication List        CONTINUE taking these medications      fluconazole 50 MG Tab  Commonly known as: DIFLUCAN  Take 50 mg by mouth once a week.     levocetirizine 5 MG tablet  Commonly known as: XYZAL  Take 5 mg by mouth.     omeprazole 40 MG capsule  Commonly known as: PRILOSEC  Take 1 capsule (40 mg total) by mouth 2 (two) times daily before meals. for 14 days            ASK your doctor about these medications      gabapentin 300 MG capsule  Commonly known as: NEURONTIN  Take 300 mg by mouth every evening.     meloxicam 15 MG tablet  Commonly known as: MOBIC  Take 15 mg by mouth once daily.              Indwelling Lines/Drains at time of discharge:   Lines/Drains/Airways       None                   Time spent on the discharge of patient: 34 minutes         JAY JAY Gonzalez  Department of Hospital Medicine  [facility]/Surg

## 2024-07-02 ENCOUNTER — OFFICE VISIT (OUTPATIENT)
Dept: SURGERY | Facility: CLINIC | Age: 69
End: 2024-07-02
Payer: MEDICARE

## 2024-07-02 VITALS — HEART RATE: 74 BPM | TEMPERATURE: 97 F | DIASTOLIC BLOOD PRESSURE: 81 MMHG | SYSTOLIC BLOOD PRESSURE: 156 MMHG

## 2024-07-02 DIAGNOSIS — K43.2 INCISIONAL HERNIA OF ANTERIOR ABDOMINAL WALL WITHOUT OBSTRUCTION OR GANGRENE: Primary | ICD-10-CM

## 2024-07-02 PROCEDURE — 99999 PR PBB SHADOW E&M-EST. PATIENT-LVL II: CPT | Mod: PBBFAC,,, | Performed by: SURGERY

## 2024-07-02 NOTE — PROGRESS NOTES
Subjective:       Patient ID: Cornelia Mcdonough is a 68 y.o. female.    Chief Complaint: No chief complaint on file.      HPI:  68-year-old female status post robotic hiatal hernia repair and robotic incisional hernia repair last Friday.  She states she is feeling well.  Pain has resolved.  No fevers.  Tolerating diet.  Ambulating and performing daily activities well.    Past Medical History:   Diagnosis Date    Arthritis     Basal cell carcinoma     face and neck    Encounter for blood transfusion     1980'S    Gallbladder attack     abdominal pain and nausea    Hiatal hernia with GERD     Sleep apnea     NO CPAP     Past Surgical History:   Procedure Laterality Date    BILATERAL TUBAL LIGATION      BLADDER SURGERY      lift, rectocele anterocele, , vaginal vault    COLONOSCOPY      2016    ESOPHAGOGASTRODUODENOSCOPY N/A 02/15/2024    Procedure: EGD (ESOPHAGOGASTRODUODENOSCOPY);  Surgeon: Gaby Brown MD;  Location: [facility];  Service: Endoscopy;  Laterality: N/A;    HYSTERECTOMY      LAPAROSCOPIC CHOLECYSTECTOMY N/A 07/10/2023    Procedure: CHOLECYSTECTOMY, LAPAROSCOPIC;  Surgeon: Cherri Gomez MD;  Location: [facility];  Service: General;  Laterality: N/A;    neck and back surgery      C 5-6 fusion; lumbar disc L 4-5    ROBOT-ASSISTED LAPAROSCOPIC REPAIR OF INCISIONAL HERNIA N/A 6/21/2024    Procedure: ROBOTIC REPAIR, HERNIA, INCISIONAL;  Surgeon: Cristian Zambrano III, MD;  Location: [facility];  Service: General;  Laterality: N/A;    ROBOT-ASSISTED REPAIR OF HIATAL HERNIA USING DA PETRA XI N/A 6/21/2024    Procedure: XI ROBOTIC REPAIR, HERNIA, HIATAL;  Surgeon: Cherri Gomez MD;  Location: [facility];  Service: General;  Laterality: N/A;    SKIN CANCER EXCISION      UPPER GASTROINTESTINAL ENDOSCOPY      2019     Review of patient's allergies indicates:   Allergen Reactions    Pyridium [phenazopyridine] Nausea And Vomiting     Medication List with Changes/Refills   Current Medications    FLUCONAZOLE  (DIFLUCAN) 50 MG TAB    Take 50 mg by mouth once a week.    GABAPENTIN (NEURONTIN) 300 MG CAPSULE    Take 300 mg by mouth every evening.    LEVOCETIRIZINE (XYZAL) 5 MG TABLET    Take 5 mg by mouth.    MELOXICAM (MOBIC) 15 MG TABLET    Take 15 mg by mouth once daily.    OMEPRAZOLE (PRILOSEC) 40 MG CAPSULE    Take 1 capsule (40 mg total) by mouth 2 (two) times daily before meals. for 14 days     Family History   Problem Relation Name Age of Onset    Cancer Mother      Cholelithiasis Mother      Cancer Father      Colon cancer Neg Hx       Social History     Socioeconomic History    Marital status:    Tobacco Use    Smoking status: Never     Passive exposure: Never    Smokeless tobacco: Never   Substance and Sexual Activity    Alcohol use: Yes     Comment: RARELY    Drug use: Never    Sexual activity: Not Currently         Review of Systems    Objective:      Physical Exam  Constitutional:       General: She is not in acute distress.  Pulmonary:      Effort: Pulmonary effort is normal. No respiratory distress.   Abdominal:      General: There is no distension.      Palpations: Abdomen is soft.      Tenderness: There is no abdominal tenderness. There is no guarding or rebound.      Hernia: No hernia is present.   Skin:     Comments: Incisions are clean, dry and intact  There is no evidence of infection, hematoma or seroma    Neurological:      Mental Status: She is alert and oriented to person, place, and time.   Psychiatric:         Behavior: Behavior is cooperative.         Assessment/Plan:   Incisional hernia of anterior abdominal wall without obstruction or gangrene    Status post incisional hernia repair along with the hiatal hernia repair last week.  No evidence of complications.  Doing well.  Follow up with me p.r.n..  Follow up if symptoms worsen or fail to improve.

## 2024-07-09 ENCOUNTER — OFFICE VISIT (OUTPATIENT)
Dept: SURGERY | Facility: CLINIC | Age: 69
End: 2024-07-09
Payer: MEDICARE

## 2024-07-09 VITALS
BODY MASS INDEX: 24.93 KG/M2 | SYSTOLIC BLOOD PRESSURE: 164 MMHG | HEART RATE: 70 BPM | WEIGHT: 132.06 LBS | RESPIRATION RATE: 16 BRPM | DIASTOLIC BLOOD PRESSURE: 77 MMHG | HEIGHT: 61 IN | TEMPERATURE: 98 F

## 2024-07-09 DIAGNOSIS — K43.2 INCISIONAL HERNIA WITHOUT OBSTRUCTION OR GANGRENE: ICD-10-CM

## 2024-07-09 DIAGNOSIS — K21.9 GASTROESOPHAGEAL REFLUX DISEASE, UNSPECIFIED WHETHER ESOPHAGITIS PRESENT: ICD-10-CM

## 2024-07-09 DIAGNOSIS — K21.9 HIATAL HERNIA WITH GERD: Primary | ICD-10-CM

## 2024-07-09 DIAGNOSIS — K44.9 HIATAL HERNIA WITH GERD: Primary | ICD-10-CM

## 2024-07-09 PROCEDURE — 99999 PR PBB SHADOW E&M-EST. PATIENT-LVL III: CPT | Mod: PBBFAC,,, | Performed by: SURGERY

## 2024-07-09 RX ORDER — ONDANSETRON 4 MG/1
4 TABLET, FILM COATED ORAL EVERY 6 HOURS PRN
COMMUNITY
Start: 2024-06-07

## 2024-07-09 NOTE — PROGRESS NOTES
Some nausea  Improved with zofran  No heartburn  Having bm  Tolerating diet  Doing small meals    Vitals:    07/09/24 0912   BP: (!) 164/77   Pulse: 70   Resp: 16   Temp: 98.2 °F (36.8 °C)     Incisions healing well    A/P  Sp hiatal hernia and incisional hernia repair   Had low grade fever at home but resolved  Tolerating diet  -reflux resolved  Ok to stop prilosec

## 2024-08-20 ENCOUNTER — PATIENT MESSAGE (OUTPATIENT)
Dept: BARIATRICS | Facility: CLINIC | Age: 69
End: 2024-08-20
Payer: MEDICARE

## 2024-08-20 ENCOUNTER — OFFICE VISIT (OUTPATIENT)
Dept: SURGERY | Facility: CLINIC | Age: 69
End: 2024-08-20
Payer: MEDICARE

## 2024-08-20 ENCOUNTER — HOSPITAL ENCOUNTER (OUTPATIENT)
Dept: RADIOLOGY | Facility: HOSPITAL | Age: 69
Discharge: HOME | End: 2024-08-20
Attending: SURGERY
Payer: MEDICARE

## 2024-08-20 VITALS
SYSTOLIC BLOOD PRESSURE: 139 MMHG | BODY MASS INDEX: 23.87 KG/M2 | HEIGHT: 61 IN | TEMPERATURE: 98 F | WEIGHT: 126.44 LBS | RESPIRATION RATE: 16 BRPM | HEART RATE: 77 BPM | DIASTOLIC BLOOD PRESSURE: 71 MMHG

## 2024-08-20 DIAGNOSIS — R10.13 EPIGASTRIC PAIN: Primary | ICD-10-CM

## 2024-08-20 DIAGNOSIS — K44.9 HIATAL HERNIA WITH GERD: ICD-10-CM

## 2024-08-20 DIAGNOSIS — K21.9 HIATAL HERNIA WITH GERD: ICD-10-CM

## 2024-08-20 DIAGNOSIS — R10.13 EPIGASTRIC PAIN: ICD-10-CM

## 2024-08-20 PROCEDURE — 25500020 PHARM REV CODE 255

## 2024-08-20 PROCEDURE — 74176 CT ABD & PELVIS W/O CONTRAST: CPT | Mod: TC

## 2024-08-20 PROCEDURE — 3075F SYST BP GE 130 - 139MM HG: CPT | Mod: CPTII,S$GLB,, | Performed by: SURGERY

## 2024-08-20 PROCEDURE — 1101F PT FALLS ASSESS-DOCD LE1/YR: CPT | Mod: CPTII,S$GLB,, | Performed by: SURGERY

## 2024-08-20 PROCEDURE — 74176 CT ABD & PELVIS W/O CONTRAST: CPT | Mod: 26,,, | Performed by: RADIOLOGY

## 2024-08-20 PROCEDURE — 3288F FALL RISK ASSESSMENT DOCD: CPT | Mod: CPTII,S$GLB,, | Performed by: SURGERY

## 2024-08-20 PROCEDURE — 99024 POSTOP FOLLOW-UP VISIT: CPT | Mod: S$GLB,,, | Performed by: SURGERY

## 2024-08-20 PROCEDURE — A9698 NON-RAD CONTRAST MATERIALNOC: HCPCS

## 2024-08-20 PROCEDURE — 99999 PR PBB SHADOW E&M-EST. PATIENT-LVL III: CPT | Mod: PBBFAC,,, | Performed by: SURGERY

## 2024-08-20 PROCEDURE — 3078F DIAST BP <80 MM HG: CPT | Mod: CPTII,S$GLB,, | Performed by: SURGERY

## 2024-08-20 PROCEDURE — 3008F BODY MASS INDEX DOCD: CPT | Mod: CPTII,S$GLB,, | Performed by: SURGERY

## 2024-08-20 PROCEDURE — 1159F MED LIST DOCD IN RCRD: CPT | Mod: CPTII,S$GLB,, | Performed by: SURGERY

## 2024-08-20 PROCEDURE — 1125F AMNT PAIN NOTED PAIN PRSNT: CPT | Mod: CPTII,S$GLB,, | Performed by: SURGERY

## 2024-08-20 RX ADMIN — IOHEXOL 500 ML: 9 SOLUTION ORAL at 10:08

## 2024-08-20 NOTE — PROGRESS NOTES
Still having epigastric pain  With or without food it is present, some regurgitation of foods occasionally    Having lots of gas  Burping some      Vitals:    08/20/24 0913   BP: 139/71   Pulse: 77   Resp: 16   Temp: 98.3 °F (36.8 °C)     Abdomen benign, soft    A/P    CT stat today- min oral contrast  Worried its gastroparesis- which may be cause of all symptoms  Will get scan to review for now but will likely need emptying study at some point

## 2024-09-10 ENCOUNTER — HOSPITAL ENCOUNTER (OUTPATIENT)
Dept: RADIOLOGY | Facility: HOSPITAL | Age: 69
Discharge: HOME | End: 2024-09-10
Attending: SURGERY
Payer: MEDICARE

## 2024-09-10 DIAGNOSIS — R10.13 EPIGASTRIC PAIN: ICD-10-CM

## 2024-09-10 PROCEDURE — 78264 GASTRIC EMPTYING IMG STUDY: CPT | Mod: TC

## 2024-09-10 PROCEDURE — 78264 GASTRIC EMPTYING IMG STUDY: CPT | Mod: 26,,, | Performed by: RADIOLOGY

## 2024-09-10 PROCEDURE — A9541 TC99M SULFUR COLLOID: HCPCS | Performed by: SURGERY

## 2024-09-10 RX ORDER — TECHNETIUM TC 99M SULFUR COLLOID 2 MG
1 KIT MISCELLANEOUS
Status: COMPLETED | OUTPATIENT
Start: 2024-09-10 | End: 2024-09-10

## 2024-09-10 RX ADMIN — TECHNETIUM TC 99M SULFUR COLLOID KIT 1 MILLICURIE: KIT at 10:09

## 2024-09-18 ENCOUNTER — TELEPHONE (OUTPATIENT)
Dept: BARIATRICS | Facility: CLINIC | Age: 69
End: 2024-09-18
Payer: MEDICARE

## 2024-09-18 NOTE — TELEPHONE ENCOUNTER
"Spoke with pt about CT and gastric emptying study results. Pt with continual abdominal pain. Discussed that feels like things not passing and "just stays there" and felt it prudent for EGD. Pt wanting MD evaluation before spending $ on another test. Aware that Dr. Gomez has left [facility]. Dr. Zambrano did see pt while in the hospital in June and would prefer to follow up with him. Reaching out to Dr. Zambrano staff for scheduling    ----- Message from Hillary Baugh RN sent at 9/17/2024  3:59 PM CDT -----  Regarding: FW: results  Contact: patient    ----- Message -----  From: Adri Horne  Sent: 9/17/2024  11:04 AM CDT  To: Patricia Harley Staff  Subject: results                                          Type:  Test Results    Who Called:  patient  Name of Test (Lab/Mammo/Etc):  Gastric  Date of Test:  09/10/24  Ordering Provider:  Dr. Gomez  Where the test was performed:  [facility]  Best Call Back Number:  [phone] (home)     Additional Information:  Please call patient to advise.  Thanks!  "

## 2024-09-27 ENCOUNTER — TELEPHONE (OUTPATIENT)
Dept: SURGERY | Facility: CLINIC | Age: 69
End: 2024-09-27
Payer: MEDICARE

## 2024-09-27 NOTE — TELEPHONE ENCOUNTER
----- Message from Sacha Salinas sent at 9/27/2024  9:32 AM CDT -----  Type:  Patient Returning Call    Who Called:  pt  Who Left Message for Patient:  killian le   Does the patient know what this is regarding?:  yes   Best Call Back Number:  [phone]  Additional Information:  Please call back to advise. Thanks.

## 2024-09-27 NOTE — TELEPHONE ENCOUNTER
----- Message from Ashly Ascencio sent at 9/27/2024 12:23 PM CDT -----  Type : Patient Call        Who Called : Patient      Does the patient know what this is regarding?: Patient is returning a call from [facility] WALKER; pt says she's been playing phone tag with staff for 2 days; please advise          Would the patient rather a call back or a response via [facility]? Call          Best Call Back Number: [phone]          Additional Information:

## 2024-09-27 NOTE — TELEPHONE ENCOUNTER
Attempted to contact patient to advise we need to move her appointment on 10-1-2024 from the afternoon to the morning as Dr Zambrano will not be in clinic in the afternoon.  Left message to call back

## 2024-10-15 ENCOUNTER — OFFICE VISIT (OUTPATIENT)
Dept: SURGERY | Facility: CLINIC | Age: 69
End: 2024-10-15
Payer: MEDICARE

## 2024-10-15 VITALS — TEMPERATURE: 98 F | DIASTOLIC BLOOD PRESSURE: 71 MMHG | SYSTOLIC BLOOD PRESSURE: 139 MMHG | HEART RATE: 77 BPM

## 2024-10-15 DIAGNOSIS — R10.13 CHRONIC EPIGASTRIC PAIN: Primary | ICD-10-CM

## 2024-10-15 DIAGNOSIS — G89.29 CHRONIC EPIGASTRIC PAIN: Primary | ICD-10-CM

## 2024-10-15 PROCEDURE — 1125F AMNT PAIN NOTED PAIN PRSNT: CPT | Mod: CPTII,S$GLB,, | Performed by: SURGERY

## 2024-10-15 PROCEDURE — 99999 PR PBB SHADOW E&M-EST. PATIENT-LVL III: CPT | Mod: PBBFAC,,, | Performed by: SURGERY

## 2024-10-15 PROCEDURE — 1160F RVW MEDS BY RX/DR IN RCRD: CPT | Mod: CPTII,S$GLB,, | Performed by: SURGERY

## 2024-10-15 PROCEDURE — 99214 OFFICE O/P EST MOD 30 MIN: CPT | Mod: S$GLB,,, | Performed by: SURGERY

## 2024-10-15 PROCEDURE — 3078F DIAST BP <80 MM HG: CPT | Mod: CPTII,S$GLB,, | Performed by: SURGERY

## 2024-10-15 PROCEDURE — 3075F SYST BP GE 130 - 139MM HG: CPT | Mod: CPTII,S$GLB,, | Performed by: SURGERY

## 2024-10-15 PROCEDURE — 1159F MED LIST DOCD IN RCRD: CPT | Mod: CPTII,S$GLB,, | Performed by: SURGERY

## 2024-10-15 NOTE — PROGRESS NOTES
Subjective:       Patient ID: Cornelia Mcdonough is a 68 y.o. female.    Chief Complaint: Establish Care      HPI:  68 year old female returns to the office with continued epigastric abdominal pain.   Patient with chronic epigastric pain. It is mild to moderate. It has been present for many years with no change after cholecystectomy, hiatal hernia repair, ventral hernia repair.  She has history of Schatzki ring with dilatation earlier this year. Also had mild gastritis at that time. CT in August unremarkable.  She has no fevers or chills.  Tolerating diet.    Past Medical History:   Diagnosis Date    Arthritis     Basal cell carcinoma     face and neck    Encounter for blood transfusion     1980'S    Gallbladder attack     abdominal pain and nausea    Hiatal hernia with GERD     Sleep apnea     NO CPAP     Past Surgical History:   Procedure Laterality Date    BILATERAL TUBAL LIGATION      BLADDER SURGERY      lift, rectocele anterocele, , vaginal vault    COLONOSCOPY      2016    ESOPHAGOGASTRODUODENOSCOPY N/A 02/15/2024    Procedure: EGD (ESOPHAGOGASTRODUODENOSCOPY);  Surgeon: Gaby Brown MD;  Location: [facility];  Service: Endoscopy;  Laterality: N/A;    HYSTERECTOMY      LAPAROSCOPIC CHOLECYSTECTOMY N/A 07/10/2023    Procedure: CHOLECYSTECTOMY, LAPAROSCOPIC;  Surgeon: Cherri Gomez MD;  Location: [facility];  Service: General;  Laterality: N/A;    neck and back surgery      C 5-6 fusion; lumbar disc L 4-5    ROBOT-ASSISTED LAPAROSCOPIC REPAIR OF INCISIONAL HERNIA N/A 6/21/2024    Procedure: ROBOTIC REPAIR, HERNIA, INCISIONAL;  Surgeon: Cristian Zambrano III, MD;  Location: [facility] OR;  Service: General;  Laterality: N/A;    ROBOT-ASSISTED REPAIR OF HIATAL HERNIA USING DA PETRA XI N/A 6/21/2024    Procedure: XI ROBOTIC REPAIR, HERNIA, HIATAL;  Surgeon: Cherri Gomez MD;  Location: [facility] OR;  Service: General;  Laterality: N/A;    SKIN CANCER EXCISION      UPPER GASTROINTESTINAL  ENDOSCOPY      2019     Review of patient's allergies indicates:   Allergen Reactions    Pyridium [phenazopyridine] Nausea And Vomiting     Medication List with Changes/Refills   Current Medications    FLUCONAZOLE (DIFLUCAN) 50 MG TAB    Take 50 mg by mouth once a week.    GABAPENTIN (NEURONTIN) 300 MG CAPSULE    Take 300 mg by mouth every evening.    LEVOCETIRIZINE (XYZAL) 5 MG TABLET    Take 5 mg by mouth.    MELOXICAM (MOBIC) 15 MG TABLET    Take 15 mg by mouth once daily.    OMEPRAZOLE (PRILOSEC) 40 MG CAPSULE    Take 1 capsule (40 mg total) by mouth 2 (two) times daily before meals. for 14 days    ONDANSETRON (ZOFRAN) 4 MG TABLET    Take 4 mg by mouth every 6 (six) hours as needed for Nausea.     Family History   Problem Relation Name Age of Onset    Cancer Mother      Cholelithiasis Mother      Cancer Father      Colon cancer Neg Hx       Social History     Socioeconomic History    Marital status:    Tobacco Use    Smoking status: Never     Passive exposure: Never    Smokeless tobacco: Never   Substance and Sexual Activity    Alcohol use: Yes     Comment: RARELY    Drug use: Never    Sexual activity: Not Currently         Review of Systems    Objective:      Physical Exam  Constitutional:       General: She is not in acute distress.  Pulmonary:      Effort: Pulmonary effort is normal. No respiratory distress.   Abdominal:      General: There is no distension.      Palpations: Abdomen is soft.      Tenderness: There is no abdominal tenderness. There is no guarding or rebound.      Hernia: No hernia is present.   Skin:     Comments: Incisions are clean, dry and intact  There is no evidence of infection, hematoma or seroma    Neurological:      Mental Status: She is alert and oriented to person, place, and time.   Psychiatric:         Behavior: Behavior is cooperative.       Assessment/Plan:   Chronic epigastric pain      Patient with chronic epigastric pain. It is mild to moderate. It has been  present for many years with no change after cholecystectomy, hiatal hernia repair, ventral hernia repair.  She has history of Schatzki ring with dilatation earlier this year. Also had mild gastritis at that time. CT in August unremarkable. Do not have a good explanation for the chronic pain as of now. Would follow up with GI on opinion. Would not recommend an exploratory operation as of now.     I discussed the proposed procedures with the patient including risks, benefits, indications, alternatives and special concerns.  The patient appears to understand and agrees to go ahead with surgery.  I have made no promises, warranties or verbal agreements beyond what was discussed above.    No follow-ups on file.

## 2025-02-04 ENCOUNTER — LAB VISIT (OUTPATIENT)
Dept: LAB | Facility: HOSPITAL | Age: 70
End: 2025-02-04
Attending: FAMILY MEDICINE
Payer: MEDICARE

## 2025-02-04 DIAGNOSIS — Z51.5 TERMINAL CARE: Primary | ICD-10-CM

## 2025-02-04 DIAGNOSIS — R51.9 HEAD PAIN: ICD-10-CM

## 2025-02-04 LAB
ALBUMIN SERPL BCP-MCNC: 4 G/DL (ref 3.5–5.2)
ALP SERPL-CCNC: 83 U/L (ref 40–150)
ALT SERPL W/O P-5'-P-CCNC: 26 U/L (ref 10–44)
ANION GAP SERPL CALC-SCNC: 12 MMOL/L (ref 8–16)
AST SERPL-CCNC: 17 U/L (ref 10–40)
BILIRUB SERPL-MCNC: 0.3 MG/DL (ref 0.1–1)
BUN SERPL-MCNC: 10 MG/DL (ref 8–23)
CALCIUM SERPL-MCNC: 9.6 MG/DL (ref 8.7–10.5)
CHLORIDE SERPL-SCNC: 107 MMOL/L (ref 95–110)
CO2 SERPL-SCNC: 23 MMOL/L (ref 23–29)
CREAT SERPL-MCNC: 0.7 MG/DL (ref 0.5–1.4)
CRP SERPL-MCNC: 2.6 MG/L (ref 0–8.2)
ERYTHROCYTE [DISTWIDTH] IN BLOOD BY AUTOMATED COUNT: 12.2 % (ref 11.5–14.5)
ERYTHROCYTE [SEDIMENTATION RATE] IN BLOOD BY WESTERGREN METHOD: 15 MM/HR (ref 0–20)
EST. GFR  (NO RACE VARIABLE): >60 ML/MIN/1.73 M^2
GLUCOSE SERPL-MCNC: 87 MG/DL (ref 70–110)
HCT VFR BLD AUTO: 35.5 % (ref 37–48.5)
HGB BLD-MCNC: 11.7 G/DL (ref 12–16)
MCH RBC QN AUTO: 31.5 PG (ref 27–31)
MCHC RBC AUTO-ENTMCNC: 33 G/DL (ref 32–36)
MCV RBC AUTO: 96 FL (ref 82–98)
PLATELET # BLD AUTO: 286 K/UL (ref 150–450)
PMV BLD AUTO: 9.2 FL (ref 9.2–12.9)
POTASSIUM SERPL-SCNC: 4 MMOL/L (ref 3.5–5.1)
PROT SERPL-MCNC: 7.5 G/DL (ref 6–8.4)
RBC # BLD AUTO: 3.71 M/UL (ref 4–5.4)
SODIUM SERPL-SCNC: 142 MMOL/L (ref 136–145)
WBC # BLD AUTO: 5.47 K/UL (ref 3.9–12.7)

## 2025-02-04 PROCEDURE — 36415 COLL VENOUS BLD VENIPUNCTURE: CPT | Performed by: FAMILY MEDICINE

## 2025-02-04 PROCEDURE — 85651 RBC SED RATE NONAUTOMATED: CPT | Performed by: FAMILY MEDICINE

## 2025-02-04 PROCEDURE — 86140 C-REACTIVE PROTEIN: CPT | Performed by: FAMILY MEDICINE

## 2025-02-04 PROCEDURE — 85027 COMPLETE CBC AUTOMATED: CPT | Performed by: FAMILY MEDICINE

## 2025-02-04 PROCEDURE — 80053 COMPREHEN METABOLIC PANEL: CPT | Performed by: FAMILY MEDICINE

## 2025-02-06 ENCOUNTER — HOSPITAL ENCOUNTER (OUTPATIENT)
Dept: RADIOLOGY | Facility: HOSPITAL | Age: 70
Discharge: HOME | End: 2025-02-06
Attending: FAMILY MEDICINE
Payer: MEDICARE

## 2025-02-06 DIAGNOSIS — R51.0 POSITIONAL HEADACHE: ICD-10-CM

## 2025-02-06 DIAGNOSIS — R51.9 PAIN IN HEAD: ICD-10-CM

## 2025-02-06 PROCEDURE — 70450 CT HEAD/BRAIN W/O DYE: CPT | Mod: TC

## 2025-02-06 PROCEDURE — 70450 CT HEAD/BRAIN W/O DYE: CPT | Mod: 26,,, | Performed by: RADIOLOGY

## 2025-06-11 ENCOUNTER — HOSPITAL ENCOUNTER (OUTPATIENT)
Dept: RADIOLOGY | Facility: HOSPITAL | Age: 70
Discharge: HOME | End: 2025-06-11
Attending: FAMILY MEDICINE
Payer: MEDICARE

## 2025-06-11 DIAGNOSIS — Z91.89 AT RISK FOR ALTERED CARDIAC FUNCTION: ICD-10-CM

## 2025-06-11 PROCEDURE — 75571 CT HRT W/O DYE W/CA TEST: CPT | Mod: 26,,, | Performed by: RADIOLOGY

## 2025-06-11 PROCEDURE — 75571 CT HRT W/O DYE W/CA TEST: CPT | Mod: TC

## (undated) DEVICE — PAD BOVIE ADULT

## (undated) DEVICE — SUT VLOC 1 12IN 1/2 CIR GS-21

## (undated) DEVICE — SOL ELECTROLUBE ANTI-STIC

## (undated) DEVICE — DISSECTOR KITTNER 13300

## (undated) DEVICE — SUT V-LOC 180 2-0 GS-22 9IN

## (undated) DEVICE — TROCAR KII BLLN 12MM 10CM

## (undated) DEVICE — DRAPE ABDOMINAL TIBURON 14X11

## (undated) DEVICE — SOL IRRI STRL WATER 1000ML

## (undated) DEVICE — SUT SILK 3-0 BLK BR SH 30IN

## (undated) DEVICE — SET TUBE PNEUMOCLEAR SE HI FLO

## (undated) DEVICE — BLADE SURG CARBON STEEL SZ11

## (undated) DEVICE — SEAL UNIVERSAL 5MM-8MM XI

## (undated) DEVICE — COVER STERILE-Z BACK TABLE XL

## (undated) DEVICE — SUT BLU GS-22 0 3.5M 23CM 9IN

## (undated) DEVICE — SUTURE MONOCRYL 4-0 27 SH MCP415H

## (undated) DEVICE — SUTURE VICRYL #0 27 UR-6 VCP603H

## (undated) DEVICE — NDL SAFETY 21G X 1 1/2 ECLPSE

## (undated) DEVICE — DRAPE COLUMN DAVINCI XI

## (undated) DEVICE — APPLIER CLIP  5MM

## (undated) DEVICE — KIT PROCEDURE STER INLET CLOSU

## (undated) DEVICE — SOL CLEARIFY VISUALIZATION LAP

## (undated) DEVICE — SYR ONLY LUER LOCK 20CC

## (undated) DEVICE — SUCTION/IRRIGATOR W/TIP

## (undated) DEVICE — ELECTRODE REM PLYHSV RETURN 9

## (undated) DEVICE — SUT ETHIBOND EXCEL 0 MO6 18

## (undated) DEVICE — GOWN POLY REINF BRTH SLV XL

## (undated) DEVICE — SOLUTION NACL 0.9% 3000ML

## (undated) DEVICE — SUT MONOCRYL 4-0 PS-2

## (undated) DEVICE — SLEEVE SCD EXPRESS KNEE MEDIUM

## (undated) DEVICE — TROCAR ADVANCED FIXATION  CFF03

## (undated) DEVICE — WARMER SCOPE SEE SHARP

## (undated) DEVICE — TROCAR ENDO Z THREAD KII 5X100

## (undated) DEVICE — OBTURATOR BLADELESS 8MM XI CLR

## (undated) DEVICE — TRAY GENERAL LAPAROSCOPY

## (undated) DEVICE — TROCAR KII FIOS 12MM X 100MM

## (undated) DEVICE — APPLICATOR CHLORAPREP ORN 26ML

## (undated) DEVICE — SUT 0 VICRYL / UR6 (J603)

## (undated) DEVICE — Device

## (undated) DEVICE — CABLE MONOPOLAR 10FT DISPOSABLE

## (undated) DEVICE — TROCAR OPTICAL ZTHREAD 12MMX100MM CTF73

## (undated) DEVICE — TRAY CATH FOL SIL URIMTR 16FR

## (undated) DEVICE — COVER TIP CURVED SCISSORS XI

## (undated) DEVICE — TOWEL OR DISP STRL BLUE 4/PK

## (undated) DEVICE — DRAPE ARM DAVINCI XI

## (undated) DEVICE — SUTURE VICRYL 3-0 SH 27 VCP416H

## (undated) DEVICE — GLOVE BIOGELPI GOLD SIZE 7.5

## (undated) DEVICE — SYR 10CC LUER LOCK

## (undated) DEVICE — SUT 3/0 27IN COATED VICRYL

## (undated) DEVICE — TOWEL OR NONABSORB ADH 17X26

## (undated) DEVICE — GLOVE SENSICARE PI GRN 8

## (undated) DEVICE — PACK CUSTOM UNIV BASIN SLI

## (undated) DEVICE — PACK SIRUS BASIC V SURG STRL

## (undated) DEVICE — GLOVE SENSICARE PI ALOE 7.5

## (undated) DEVICE — SOL NACL IRR 3000ML

## (undated) DEVICE — NDL PNEUMO INSUFFLATI 120MM

## (undated) DEVICE — SCISSORS 5MM APPLIED MEDICAL   CB030

## (undated) DEVICE — SOLUTION IRRI NS BOTTLE 1000ML R5200-01

## (undated) DEVICE — SCISSOR 5MMX35CM DIRECT DRIVE